# Patient Record
Sex: MALE | Race: WHITE | Employment: FULL TIME | URBAN - NONMETROPOLITAN AREA
[De-identification: names, ages, dates, MRNs, and addresses within clinical notes are randomized per-mention and may not be internally consistent; named-entity substitution may affect disease eponyms.]

---

## 2017-03-08 ENCOUNTER — OFFICE VISIT (OUTPATIENT)
Dept: FAMILY MEDICINE CLINIC | Age: 27
End: 2017-03-08

## 2017-03-08 VITALS
HEIGHT: 69 IN | WEIGHT: 197.2 LBS | SYSTOLIC BLOOD PRESSURE: 130 MMHG | DIASTOLIC BLOOD PRESSURE: 62 MMHG | RESPIRATION RATE: 14 BRPM | HEART RATE: 74 BPM | BODY MASS INDEX: 29.21 KG/M2

## 2017-03-08 DIAGNOSIS — Z11.4 SCREENING FOR HIV (HUMAN IMMUNODEFICIENCY VIRUS): ICD-10-CM

## 2017-03-08 DIAGNOSIS — N28.1 BILATERAL RENAL CYSTS: ICD-10-CM

## 2017-03-08 DIAGNOSIS — I10 ESSENTIAL HYPERTENSION: Primary | ICD-10-CM

## 2017-03-08 PROCEDURE — 99214 OFFICE O/P EST MOD 30 MIN: CPT | Performed by: FAMILY MEDICINE

## 2017-03-08 RX ORDER — AMLODIPINE BESYLATE AND BENAZEPRIL HYDROCHLORIDE 10; 40 MG/1; MG/1
CAPSULE ORAL
Qty: 90 CAPSULE | Refills: 1 | Status: SHIPPED | OUTPATIENT
Start: 2017-03-08 | End: 2017-09-13 | Stop reason: SDUPTHER

## 2017-08-22 ENCOUNTER — HOSPITAL ENCOUNTER (OUTPATIENT)
Dept: ULTRASOUND IMAGING | Age: 27
Discharge: HOME | End: 2017-08-22
Payer: COMMERCIAL

## 2017-08-22 DIAGNOSIS — N28.1 BILATERAL RENAL CYSTS: ICD-10-CM

## 2017-08-22 PROCEDURE — 76775 US EXAM ABDO BACK WALL LIM: CPT

## 2017-09-06 ENCOUNTER — HOSPITAL ENCOUNTER (OUTPATIENT)
Dept: LAB | Age: 27
Discharge: HOME | End: 2017-09-06
Payer: COMMERCIAL

## 2017-09-06 DIAGNOSIS — I10 ESSENTIAL HYPERTENSION: ICD-10-CM

## 2017-09-06 DIAGNOSIS — Z11.4 SCREENING FOR HIV (HUMAN IMMUNODEFICIENCY VIRUS): ICD-10-CM

## 2017-09-06 LAB
ALBUMIN SERPL-MCNC: 5.3 G/DL (ref 3.5–5.2)
ALBUMIN/GLOBULIN RATIO: 2 (ref 1–2.5)
ALP BLD-CCNC: 45 U/L (ref 40–129)
ALT SERPL-CCNC: 64 U/L (ref 5–41)
ANION GAP SERPL CALCULATED.3IONS-SCNC: 13 MMOL/L (ref 9–17)
AST SERPL-CCNC: 31 U/L
BILIRUB SERPL-MCNC: 1.38 MG/DL (ref 0.3–1.2)
BUN BLDV-MCNC: 12 MG/DL (ref 6–20)
BUN/CREAT BLD: 13 (ref 9–20)
CALCIUM SERPL-MCNC: 10 MG/DL (ref 8.6–10.4)
CHLORIDE BLD-SCNC: 96 MMOL/L (ref 98–107)
CHOLESTEROL/HDL RATIO: 3.2
CHOLESTEROL: 165 MG/DL
CO2: 29 MMOL/L (ref 20–31)
CREAT SERPL-MCNC: 0.9 MG/DL (ref 0.7–1.2)
GFR AFRICAN AMERICAN: >60 ML/MIN
GFR NON-AFRICAN AMERICAN: >60 ML/MIN
GFR SERPL CREATININE-BSD FRML MDRD: ABNORMAL ML/MIN/{1.73_M2}
GFR SERPL CREATININE-BSD FRML MDRD: ABNORMAL ML/MIN/{1.73_M2}
GLUCOSE BLD-MCNC: 94 MG/DL (ref 70–99)
HDLC SERPL-MCNC: 52 MG/DL
HIV AG/AB: NONREACTIVE
LDL CHOLESTEROL: 85 MG/DL (ref 0–130)
POTASSIUM SERPL-SCNC: 4.5 MMOL/L (ref 3.7–5.3)
SODIUM BLD-SCNC: 138 MMOL/L (ref 135–144)
TOTAL PROTEIN: 7.9 G/DL (ref 6.4–8.3)
TRIGL SERPL-MCNC: 139 MG/DL
VLDLC SERPL CALC-MCNC: NORMAL MG/DL (ref 1–30)

## 2017-09-06 PROCEDURE — 36415 COLL VENOUS BLD VENIPUNCTURE: CPT

## 2017-09-06 PROCEDURE — 80053 COMPREHEN METABOLIC PANEL: CPT

## 2017-09-06 PROCEDURE — 87389 HIV-1 AG W/HIV-1&-2 AB AG IA: CPT

## 2017-09-06 PROCEDURE — 80061 LIPID PANEL: CPT

## 2017-09-09 ENCOUNTER — PATIENT MESSAGE (OUTPATIENT)
Dept: FAMILY MEDICINE CLINIC | Age: 27
End: 2017-09-09

## 2017-09-13 ENCOUNTER — OFFICE VISIT (OUTPATIENT)
Dept: FAMILY MEDICINE CLINIC | Age: 27
End: 2017-09-13
Payer: COMMERCIAL

## 2017-09-13 VITALS
SYSTOLIC BLOOD PRESSURE: 138 MMHG | BODY MASS INDEX: 30.81 KG/M2 | HEIGHT: 69 IN | RESPIRATION RATE: 16 BRPM | WEIGHT: 208 LBS | HEART RATE: 80 BPM | DIASTOLIC BLOOD PRESSURE: 72 MMHG

## 2017-09-13 DIAGNOSIS — Z23 NEED FOR TDAP VACCINATION: ICD-10-CM

## 2017-09-13 DIAGNOSIS — I10 ESSENTIAL HYPERTENSION: Primary | ICD-10-CM

## 2017-09-13 DIAGNOSIS — Z23 NEED FOR INFLUENZA VACCINATION: ICD-10-CM

## 2017-09-13 DIAGNOSIS — L98.9 SKIN LESION OF SCALP: ICD-10-CM

## 2017-09-13 PROCEDURE — 99214 OFFICE O/P EST MOD 30 MIN: CPT | Performed by: FAMILY MEDICINE

## 2017-09-13 RX ORDER — AMLODIPINE BESYLATE AND BENAZEPRIL HYDROCHLORIDE 10; 40 MG/1; MG/1
CAPSULE ORAL
Qty: 90 CAPSULE | Refills: 1 | Status: SHIPPED | OUTPATIENT
Start: 2017-09-13 | End: 2018-08-15 | Stop reason: SDUPTHER

## 2018-05-08 ENCOUNTER — TELEPHONE (OUTPATIENT)
Dept: PRIMARY CARE CLINIC | Age: 28
End: 2018-05-08

## 2018-08-15 DIAGNOSIS — I10 ESSENTIAL HYPERTENSION: ICD-10-CM

## 2018-08-15 RX ORDER — AMLODIPINE BESYLATE AND BENAZEPRIL HYDROCHLORIDE 10; 40 MG/1; MG/1
1 CAPSULE ORAL DAILY
Qty: 90 CAPSULE | Refills: 0 | Status: SHIPPED | OUTPATIENT
Start: 2018-08-15 | End: 2018-12-19 | Stop reason: SDUPTHER

## 2018-10-11 ENCOUNTER — TELEPHONE (OUTPATIENT)
Dept: PRIMARY CARE CLINIC | Age: 28
End: 2018-10-11

## 2018-10-31 ENCOUNTER — TELEPHONE (OUTPATIENT)
Dept: PRIMARY CARE CLINIC | Age: 28
End: 2018-10-31

## 2018-12-19 DIAGNOSIS — I10 ESSENTIAL HYPERTENSION: ICD-10-CM

## 2018-12-19 RX ORDER — AMLODIPINE BESYLATE AND BENAZEPRIL HYDROCHLORIDE 10; 40 MG/1; MG/1
1 CAPSULE ORAL DAILY
Qty: 90 CAPSULE | Refills: 0 | Status: SHIPPED | OUTPATIENT
Start: 2018-12-19 | End: 2019-01-02 | Stop reason: SDUPTHER

## 2018-12-19 NOTE — TELEPHONE ENCOUNTER
From: Salvador Vogel  Sent: 12/19/2018 12:30 PM EST  Subject: Medication Renewal Request    Clarke HUNTTremaine Frank would like a refill of the following medications:     amLODIPine-benazepril (LOTREL) 10-40 MG per capsule Lawrence Mar MD]   Patient Comment: I forgot to mention this in my earlier request. Luz Mejias submitted a refill order for this medication on November 13th, but it was declined by the clinic. I am now running very low on this medication as a result and need to have it refilled as soon as possible. I am changing the pharmacy to the [facility], because it will be faster than having it mailed by [facility]. Thanks.     Preferred pharmacy: [facility] [phone]

## 2019-01-02 ENCOUNTER — HOSPITAL ENCOUNTER (OUTPATIENT)
Dept: LAB | Age: 29
Discharge: HOME | End: 2019-01-02
Payer: COMMERCIAL

## 2019-01-02 ENCOUNTER — OFFICE VISIT (OUTPATIENT)
Dept: FAMILY MEDICINE CLINIC | Age: 29
End: 2019-01-02
Payer: COMMERCIAL

## 2019-01-02 VITALS
WEIGHT: 220.4 LBS | HEART RATE: 86 BPM | BODY MASS INDEX: 32.64 KG/M2 | HEIGHT: 69 IN | SYSTOLIC BLOOD PRESSURE: 128 MMHG | DIASTOLIC BLOOD PRESSURE: 82 MMHG | RESPIRATION RATE: 16 BRPM

## 2019-01-02 DIAGNOSIS — I10 ESSENTIAL HYPERTENSION: ICD-10-CM

## 2019-01-02 DIAGNOSIS — I10 ESSENTIAL HYPERTENSION: Primary | ICD-10-CM

## 2019-01-02 DIAGNOSIS — Z23 NEED FOR TDAP VACCINATION: ICD-10-CM

## 2019-01-02 LAB
ALBUMIN SERPL-MCNC: 5.3 G/DL (ref 3.5–5.2)
ALBUMIN/GLOBULIN RATIO: 2.2 (ref 1–2.5)
ALP BLD-CCNC: 52 U/L (ref 40–129)
ALT SERPL-CCNC: 59 U/L (ref 5–41)
ANION GAP SERPL CALCULATED.3IONS-SCNC: 12 MMOL/L (ref 9–17)
AST SERPL-CCNC: 26 U/L
BILIRUB SERPL-MCNC: 0.88 MG/DL (ref 0.3–1.2)
BUN BLDV-MCNC: 15 MG/DL (ref 6–20)
BUN/CREAT BLD: 14 (ref 9–20)
CALCIUM SERPL-MCNC: 10.1 MG/DL (ref 8.6–10.4)
CHLORIDE BLD-SCNC: 101 MMOL/L (ref 98–107)
CO2: 28 MMOL/L (ref 20–31)
CREAT SERPL-MCNC: 1.04 MG/DL (ref 0.7–1.2)
GFR AFRICAN AMERICAN: >60 ML/MIN
GFR NON-AFRICAN AMERICAN: >60 ML/MIN
GFR SERPL CREATININE-BSD FRML MDRD: ABNORMAL ML/MIN/{1.73_M2}
GFR SERPL CREATININE-BSD FRML MDRD: ABNORMAL ML/MIN/{1.73_M2}
GLUCOSE BLD-MCNC: 106 MG/DL (ref 70–99)
POTASSIUM SERPL-SCNC: 4.6 MMOL/L (ref 3.7–5.3)
SODIUM BLD-SCNC: 141 MMOL/L (ref 135–144)
TOTAL PROTEIN: 7.7 G/DL (ref 6.4–8.3)

## 2019-01-02 PROCEDURE — 80053 COMPREHEN METABOLIC PANEL: CPT

## 2019-01-02 PROCEDURE — 36415 COLL VENOUS BLD VENIPUNCTURE: CPT

## 2019-01-02 PROCEDURE — 99213 OFFICE O/P EST LOW 20 MIN: CPT | Performed by: FAMILY MEDICINE

## 2019-01-02 RX ORDER — AMLODIPINE BESYLATE AND BENAZEPRIL HYDROCHLORIDE 10; 40 MG/1; MG/1
1 CAPSULE ORAL DAILY
Qty: 90 CAPSULE | Refills: 1 | Status: SHIPPED | OUTPATIENT
Start: 2019-01-02 | End: 2019-07-02 | Stop reason: SDUPTHER

## 2019-01-02 ASSESSMENT — PATIENT HEALTH QUESTIONNAIRE - PHQ9
2. FEELING DOWN, DEPRESSED OR HOPELESS: 0
SUM OF ALL RESPONSES TO PHQ QUESTIONS 1-9: 0
SUM OF ALL RESPONSES TO PHQ9 QUESTIONS 1 & 2: 0
SUM OF ALL RESPONSES TO PHQ QUESTIONS 1-9: 0
1. LITTLE INTEREST OR PLEASURE IN DOING THINGS: 0

## 2019-01-03 DIAGNOSIS — I10 ESSENTIAL HYPERTENSION: Primary | ICD-10-CM

## 2019-01-03 DIAGNOSIS — R73.01 ELEVATED FASTING GLUCOSE: ICD-10-CM

## 2019-01-30 ENCOUNTER — TELEPHONE (OUTPATIENT)
Dept: PRIMARY CARE CLINIC | Age: 29
End: 2019-01-30

## 2019-03-13 ENCOUNTER — TELEPHONE (OUTPATIENT)
Dept: PRIMARY CARE CLINIC | Age: 29
End: 2019-03-13

## 2019-06-27 ENCOUNTER — HOSPITAL ENCOUNTER (OUTPATIENT)
Dept: LAB | Age: 29
Discharge: HOME | End: 2019-06-27
Payer: COMMERCIAL

## 2019-06-27 DIAGNOSIS — I10 ESSENTIAL HYPERTENSION: ICD-10-CM

## 2019-06-27 DIAGNOSIS — R73.01 ELEVATED FASTING GLUCOSE: ICD-10-CM

## 2019-06-27 LAB
ALBUMIN SERPL-MCNC: 5.3 G/DL (ref 3.5–5.2)
ALBUMIN/GLOBULIN RATIO: 2.4 (ref 1–2.5)
ALP BLD-CCNC: 47 U/L (ref 40–129)
ALT SERPL-CCNC: 56 U/L (ref 5–41)
ANION GAP SERPL CALCULATED.3IONS-SCNC: 10 MMOL/L (ref 9–17)
AST SERPL-CCNC: 29 U/L
BILIRUB SERPL-MCNC: 1.37 MG/DL (ref 0.3–1.2)
BUN BLDV-MCNC: 14 MG/DL (ref 6–20)
BUN/CREAT BLD: 15 (ref 9–20)
CALCIUM SERPL-MCNC: 10.2 MG/DL (ref 8.6–10.4)
CHLORIDE BLD-SCNC: 99 MMOL/L (ref 98–107)
CHOLESTEROL, FASTING: 148 MG/DL
CHOLESTEROL/HDL RATIO: 3.2
CO2: 30 MMOL/L (ref 20–31)
CREAT SERPL-MCNC: 0.95 MG/DL (ref 0.7–1.2)
ESTIMATED AVERAGE GLUCOSE: 82 MG/DL
GFR AFRICAN AMERICAN: >60 ML/MIN
GFR NON-AFRICAN AMERICAN: >60 ML/MIN
GFR SERPL CREATININE-BSD FRML MDRD: ABNORMAL ML/MIN/{1.73_M2}
GFR SERPL CREATININE-BSD FRML MDRD: ABNORMAL ML/MIN/{1.73_M2}
GLUCOSE FASTING: 98 MG/DL (ref 70–99)
HBA1C MFR BLD: 4.5 % (ref 4.8–5.9)
HDLC SERPL-MCNC: 46 MG/DL
LDL CHOLESTEROL: 79 MG/DL (ref 0–130)
POTASSIUM SERPL-SCNC: 4.5 MMOL/L (ref 3.7–5.3)
SODIUM BLD-SCNC: 139 MMOL/L (ref 135–144)
TOTAL PROTEIN: 7.5 G/DL (ref 6.4–8.3)
TRIGLYCERIDE, FASTING: 117 MG/DL
VLDLC SERPL CALC-MCNC: NORMAL MG/DL (ref 1–30)

## 2019-06-27 PROCEDURE — 80053 COMPREHEN METABOLIC PANEL: CPT

## 2019-06-27 PROCEDURE — 83036 HEMOGLOBIN GLYCOSYLATED A1C: CPT

## 2019-06-27 PROCEDURE — 36415 COLL VENOUS BLD VENIPUNCTURE: CPT

## 2019-06-27 PROCEDURE — 80061 LIPID PANEL: CPT

## 2019-07-02 ENCOUNTER — OFFICE VISIT (OUTPATIENT)
Dept: FAMILY MEDICINE CLINIC | Age: 29
End: 2019-07-02
Payer: COMMERCIAL

## 2019-07-02 VITALS
HEART RATE: 74 BPM | HEIGHT: 69 IN | DIASTOLIC BLOOD PRESSURE: 78 MMHG | SYSTOLIC BLOOD PRESSURE: 118 MMHG | WEIGHT: 217.4 LBS | RESPIRATION RATE: 16 BRPM | BODY MASS INDEX: 32.2 KG/M2

## 2019-07-02 DIAGNOSIS — I10 ESSENTIAL HYPERTENSION: Primary | ICD-10-CM

## 2019-07-02 PROCEDURE — 99213 OFFICE O/P EST LOW 20 MIN: CPT | Performed by: FAMILY MEDICINE

## 2019-07-02 RX ORDER — AMLODIPINE BESYLATE AND BENAZEPRIL HYDROCHLORIDE 10; 40 MG/1; MG/1
1 CAPSULE ORAL DAILY
Qty: 90 CAPSULE | Refills: 1 | Status: SHIPPED | OUTPATIENT
Start: 2019-07-02 | End: 2020-01-08 | Stop reason: SDUPTHER

## 2019-07-02 ASSESSMENT — PATIENT HEALTH QUESTIONNAIRE - PHQ9
1. LITTLE INTEREST OR PLEASURE IN DOING THINGS: 0
SUM OF ALL RESPONSES TO PHQ9 QUESTIONS 1 & 2: 0
SUM OF ALL RESPONSES TO PHQ QUESTIONS 1-9: 0
2. FEELING DOWN, DEPRESSED OR HOPELESS: 0
SUM OF ALL RESPONSES TO PHQ QUESTIONS 1-9: 0

## 2019-07-02 NOTE — PROGRESS NOTES
or rub. Lower extremities have no edema. Labs done 6/27/2019 were reviewed with the patient:   Hospital Outpatient Visit on 06/27/2019   Component Date Value Ref Range Status    Hemoglobin A1C 06/27/2019 4.5* 4.8 - 5.9 % Final    Estimated Avg Glucose 06/27/2019 82  mg/dL Final    Cholesterol, Fasting 06/27/2019 148  <200 mg/dL Final    Comment:    Cholesterol Guidelines:      <200  Desirable   200-240  Borderline      >240  Undesirable         HDL 06/27/2019 46  >40 mg/dL Final    Comment:    HDL Guidelines:    <40     Undesirable   40-59    Borderline    >59     Desirable         LDL Cholesterol 06/27/2019 79  0 - 130 mg/dL Final    Comment:    LDL Guidelines:     <100    Desirable   100-129   Near to/above Desirable   130-159   Borderline      >159   Undesirable     Direct (measured) LDL and calculated LDL are not interchangeable tests.  Chol/HDL Ratio 06/27/2019 3.2  <5 Final            Triglyceride, Fasting 06/27/2019 117  <150 mg/dL Final    Comment:    Triglyceride Guidelines:     <150   Desirable   150-199  Borderline   200-499  High     >499   Very high   Based on AHA Guidelines for fasting triglyceride, October 2012.          VLDL 06/27/2019 NOT REPORTED  1 - 30 mg/dL Final    Glucose, Fasting 06/27/2019 98  70 - 99 mg/dL Final    BUN 06/27/2019 14  6 - 20 mg/dL Final    CREATININE 06/27/2019 0.95  0.70 - 1.20 mg/dL Final    Bun/Cre Ratio 06/27/2019 15  9 - 20 Final    Calcium 06/27/2019 10.2  8.6 - 10.4 mg/dL Final    Sodium 06/27/2019 139  135 - 144 mmol/L Final    Potassium 06/27/2019 4.5  3.7 - 5.3 mmol/L Final    Chloride 06/27/2019 99  98 - 107 mmol/L Final    CO2 06/27/2019 30  20 - 31 mmol/L Final    Anion Gap 06/27/2019 10  9 - 17 mmol/L Final    Alkaline Phosphatase 06/27/2019 47  40 - 129 U/L Final    ALT 06/27/2019 56* 5 - 41 U/L Final    AST 06/27/2019 29  <40 U/L Final    Total Bilirubin 06/27/2019 1.37* 0.3 - 1.2 mg/dL Final    Total Protein 06/27/2019 7.5  6.4 - 8.3 g/dL Final    Alb 06/27/2019 5.3* 3.5 - 5.2 g/dL Final    Albumin/Globulin Ratio 06/27/2019 2.4  1.0 - 2.5 Final    GFR Non- 06/27/2019 >60  >60 mL/min Final    GFR  06/27/2019 >60  >60 mL/min Final    GFR Comment 06/27/2019        Final    Comment: Average GFR for 30-36 years old:   80 mL/min/1.73sq m  Chronic Kidney Disease:   <60 mL/min/1.73sq m  Kidney failure:   <15 mL/min/1.73sq m              eGFR calculated using average adult body mass. Additional eGFR calculator available at:        [email or web address]            GFR Staging 06/27/2019 NOT REPORTED   Final         Assessment and Plan:    1. Essential hypertension  His blood pressure is well-controlled. (BP: 118/78)   He was advised to continue current medications. Amlodipine-Benazepril was refilled:   - amLODIPine-benazepril (LOTREL) 10-40 MG per capsule; Take 1 capsule by mouth daily  Dispense: 90 capsule; Refill: 1    - Comprehensive Metabolic Panel; Future prior to his return visit in 6 months. 2.  Routine health maintenance  Health maintenance was reviewed with the patient. Annual influenza vaccine was recommended. He states that he did received at least one varicella vaccine. I will see if documentation from the health department can be obtained. All other health maintenance, including Tdap, is up to date at this time. There is no indication for Pneumovax or Prevnar-13 at this time.      (Please note that portions of this note were completed with a voice-recognition program. Efforts were made to edit the dictation but occasionally words are mis-transcribed.)

## 2020-01-02 ENCOUNTER — HOSPITAL ENCOUNTER (OUTPATIENT)
Dept: LAB | Age: 30
Discharge: HOME | End: 2020-01-02
Payer: COMMERCIAL

## 2020-01-02 LAB
ALBUMIN SERPL-MCNC: 5.3 G/DL (ref 3.5–5.2)
ALBUMIN/GLOBULIN RATIO: 2 (ref 1–2.5)
ALP BLD-CCNC: 53 U/L (ref 40–129)
ALT SERPL-CCNC: 66 U/L (ref 5–41)
ANION GAP SERPL CALCULATED.3IONS-SCNC: 13 MMOL/L (ref 9–17)
AST SERPL-CCNC: 32 U/L
BILIRUB SERPL-MCNC: 1.03 MG/DL (ref 0.3–1.2)
BUN BLDV-MCNC: 13 MG/DL (ref 6–20)
BUN/CREAT BLD: 13 (ref 9–20)
CALCIUM SERPL-MCNC: 10.3 MG/DL (ref 8.6–10.4)
CHLORIDE BLD-SCNC: 101 MMOL/L (ref 98–107)
CO2: 28 MMOL/L (ref 20–31)
CREAT SERPL-MCNC: 1.01 MG/DL (ref 0.7–1.2)
GFR AFRICAN AMERICAN: >60 ML/MIN
GFR NON-AFRICAN AMERICAN: >60 ML/MIN
GFR SERPL CREATININE-BSD FRML MDRD: ABNORMAL ML/MIN/{1.73_M2}
GFR SERPL CREATININE-BSD FRML MDRD: ABNORMAL ML/MIN/{1.73_M2}
GLUCOSE BLD-MCNC: 104 MG/DL (ref 70–99)
POTASSIUM SERPL-SCNC: 4.5 MMOL/L (ref 3.7–5.3)
SODIUM BLD-SCNC: 142 MMOL/L (ref 135–144)
TOTAL PROTEIN: 8 G/DL (ref 6.4–8.3)

## 2020-01-02 PROCEDURE — 80053 COMPREHEN METABOLIC PANEL: CPT

## 2020-01-02 PROCEDURE — 36415 COLL VENOUS BLD VENIPUNCTURE: CPT

## 2020-01-08 ENCOUNTER — OFFICE VISIT (OUTPATIENT)
Dept: FAMILY MEDICINE CLINIC | Age: 30
End: 2020-01-08
Payer: COMMERCIAL

## 2020-01-08 VITALS
BODY MASS INDEX: 33.04 KG/M2 | HEART RATE: 84 BPM | DIASTOLIC BLOOD PRESSURE: 86 MMHG | SYSTOLIC BLOOD PRESSURE: 128 MMHG | WEIGHT: 223.1 LBS | RESPIRATION RATE: 16 BRPM | HEIGHT: 69 IN

## 2020-01-08 PROCEDURE — 99212 OFFICE O/P EST SF 10 MIN: CPT | Performed by: FAMILY MEDICINE

## 2020-01-08 PROCEDURE — 99395 PREV VISIT EST AGE 18-39: CPT | Performed by: FAMILY MEDICINE

## 2020-01-08 RX ORDER — AMLODIPINE BESYLATE AND BENAZEPRIL HYDROCHLORIDE 10; 40 MG/1; MG/1
1 CAPSULE ORAL DAILY
Qty: 90 CAPSULE | Refills: 1 | Status: SHIPPED | OUTPATIENT
Start: 2020-01-08 | End: 2020-04-09 | Stop reason: SDUPTHER

## 2020-01-08 ASSESSMENT — PATIENT HEALTH QUESTIONNAIRE - PHQ9
2. FEELING DOWN, DEPRESSED OR HOPELESS: 0
SUM OF ALL RESPONSES TO PHQ QUESTIONS 1-9: 0
SUM OF ALL RESPONSES TO PHQ9 QUESTIONS 1 & 2: 0
1. LITTLE INTEREST OR PLEASURE IN DOING THINGS: 0
SUM OF ALL RESPONSES TO PHQ QUESTIONS 1-9: 0

## 2020-01-08 NOTE — PROGRESS NOTES
[facility]             [address] [facility] [place], [address]                        Telephone [phone]             Fax [phone]     Barney Chappell  1990  MRN:  [id number]  Date of visit:  1/8/2020    Subjective:    Barney Chappell is a 30 y.o.  male who presents to [facility] today (1/8/2020) for follow up/evaluation of: Annual Exam and Hypertension      He states that he is feeling well. He reports that he has not been exercising regularly. He denies chest pain or shortness of breath with activity or at rest.  He is tolerating his medications well. He has no new concerns or complaints today. He has the following problem list:  Patient Active Problem List   Diagnosis    Bilateral renal cysts    Hypercalcemia    Essential hypertension    Obesity        Current medications are:  Outpatient Medications Marked as Taking for the 1/8/20 encounter (Office Visit) with Angel Novak MD   Medication Sig Dispense Refill    amLODIPine-benazepril (LOTREL) 10-40 MG per capsule Take 1 capsule by mouth daily 90 capsule 1    Multiple Vitamins-Minerals (MULTIVITAMIN PO) Take 1 tablet by mouth daily. He has No Known Allergies. He  reports that he has never smoked. He has never used smokeless tobacco.      Objective:    Vitals:    01/08/20 0949   BP: 128/86   Site: Right Upper Arm   Position: Sitting   Cuff Size: Large Adult   Pulse: 84   Resp: 16   Weight: 223 lb 1.6 oz (101.2 kg)   Height: 5' 9\" (1.753 m)     Body mass index is 32.95 kg/m². Obese  male, healthy-appearing, alert, cooperative and in no distress. Neck supple. No adenopathy. Thyroid symmetric, normal size. Chest:  Normal expansion. Clear to auscultation. No rales, rhonchi, or wheezing. Heart sounds are normal.  Regular rate and rhythm without murmur, gallop or rub. Lower extremities have no edema.     Labs were ordered to be done prior to his return visit in 6 months:  - Lipid, Fasting; Future  - Comprehensive Metabolic Panel; Future    Printed information regarding DASH Diet was provided to the patient with his after visit summary. 3. Elevated fasting glucose  I recommended that he decrease his intake of processed carbohydrates, begin a regular exercise program, and attempt to lose weight. Labs were ordered to be done prior to his return visit in 6 months:   - Comprehensive Metabolic Panel; Future  - Hemoglobin A1C; Future    Printed information regarding Prediabetes was provided to the patient with his after visit summary. 4. Immunity status testing  I discussed with the patient that I recommend either getting a second dose of varicella vaccine, or assessing immune status. - Varicella Zoster Antibody, IgG; Future was ordered to be done prior to his return visit in 6 months. 4.  Routine health maintenance  Health maintenance was reviewed with the patient. He has received an influenza vaccine this influenza season. All other health maintenance, including Tdap, is up to date at this time. There is no indication for Pneumovax or Prevnar-13 at this time.      (Please note that portions of this note were completed with a voice-recognition program. Efforts were made to edit the dictation but occasionally words are mis-transcribed.)

## 2020-01-08 NOTE — PATIENT INSTRUCTIONS
poultry. The DASH diet is one of several lifestyle changes your doctor may recommend to lower your high blood pressure. Your doctor may also want you to decrease the amount of sodium in your diet. Lowering sodium while following the DASH diet can lower blood pressure even further than just the DASH diet alone. Follow-up care is a key part of your treatment and safety. Be sure to make and go to all appointments, and call your doctor if you are having problems. It's also a good idea to know your test results and keep a list of the medicines you take. How can you care for yourself at home? Following the DASH diet  · Eat 4 to 5 servings of fruit each day. A serving is 1 medium-sized piece of fruit, ½ cup chopped or canned fruit, 1/4 cup dried fruit, or 4 ounces (½ cup) of fruit juice. Choose fruit more often than fruit juice. · Eat 4 to 5 servings of vegetables each day. A serving is 1 cup of lettuce or raw leafy vegetables, ½ cup of chopped or cooked vegetables, or 4 ounces (½ cup) of vegetable juice. Choose vegetables more often than vegetable juice. · Get 2 to 3 servings of low-fat and fat-free dairy each day. A serving is 8 ounces of milk, 1 cup of yogurt, or 1 ½ ounces of cheese. · Eat 6 to 8 servings of grains each day. A serving is 1 slice of bread, 1 ounce of dry cereal, or ½ cup of cooked rice, pasta, or cooked cereal. Try to choose whole-grain products as much as possible. · Limit lean meat, poultry, and fish to 2 servings each day. A serving is 3 ounces, about the size of a deck of cards. · Eat 4 to 5 servings of nuts, seeds, and legumes (cooked dried beans, lentils, and split peas) each week. A serving is 1/3 cup of nuts, 2 tablespoons of seeds, or ½ cup of cooked beans or peas. · Limit fats and oils to 2 to 3 servings each day. A serving is 1 teaspoon of vegetable oil or 2 tablespoons of salad dressing. · Limit sweets and added sugars to 5 servings or less a week.  A serving is 1 tablespoon jelly

## 2020-04-09 ENCOUNTER — PATIENT MESSAGE (OUTPATIENT)
Dept: FAMILY MEDICINE CLINIC | Age: 30
End: 2020-04-09

## 2020-04-09 RX ORDER — AMLODIPINE BESYLATE AND BENAZEPRIL HYDROCHLORIDE 10; 40 MG/1; MG/1
1 CAPSULE ORAL DAILY
Qty: 30 CAPSULE | Refills: 0 | Status: SHIPPED | OUTPATIENT
Start: 2020-04-09 | End: 2020-04-28

## 2020-04-09 NOTE — TELEPHONE ENCOUNTER
From: Brandon Dill  To: Niharika Steiner MD  Sent: 4/9/2020 9:38 AM EDT  Subject: Prescription Question    My blood pressure medication refill was shipped out on March 24th, but I still haven't received it yet. The last update on the tracking page is that it was at a distribution center in [facility] on March 25th. I only have about a week of blood pressure medication left. I was wondering if I could have a prescription put in at the  [facility] as a backup in case my medicine doesn't arrive before I run out. Thanks.

## 2020-04-28 RX ORDER — AMLODIPINE BESYLATE AND BENAZEPRIL HYDROCHLORIDE 10; 40 MG/1; MG/1
CAPSULE ORAL
Qty: 90 CAPSULE | Refills: 0 | Status: SHIPPED | OUTPATIENT
Start: 2020-04-28 | End: 2020-07-09 | Stop reason: SDUPTHER

## 2020-07-06 ENCOUNTER — HOSPITAL ENCOUNTER (OUTPATIENT)
Dept: LAB | Age: 30
Discharge: HOME | End: 2020-07-06
Payer: COMMERCIAL

## 2020-07-06 LAB
ALBUMIN SERPL-MCNC: 5.3 G/DL (ref 3.5–5.2)
ALBUMIN/GLOBULIN RATIO: 2.7 (ref 1–2.5)
ALP BLD-CCNC: 49 U/L (ref 40–129)
ALT SERPL-CCNC: 42 U/L (ref 5–41)
ANION GAP SERPL CALCULATED.3IONS-SCNC: 13 MMOL/L (ref 9–17)
AST SERPL-CCNC: 24 U/L
BILIRUB SERPL-MCNC: 0.99 MG/DL (ref 0.3–1.2)
BUN BLDV-MCNC: 12 MG/DL (ref 6–20)
BUN/CREAT BLD: 12 (ref 9–20)
CALCIUM SERPL-MCNC: 10.2 MG/DL (ref 8.6–10.4)
CHLORIDE BLD-SCNC: 102 MMOL/L (ref 98–107)
CHOLESTEROL, FASTING: 153 MG/DL
CHOLESTEROL/HDL RATIO: 2.9
CO2: 27 MMOL/L (ref 20–31)
CREAT SERPL-MCNC: 1.03 MG/DL (ref 0.7–1.2)
ESTIMATED AVERAGE GLUCOSE: 88 MG/DL
GFR AFRICAN AMERICAN: >60 ML/MIN
GFR NON-AFRICAN AMERICAN: >60 ML/MIN
GFR SERPL CREATININE-BSD FRML MDRD: ABNORMAL ML/MIN/{1.73_M2}
GFR SERPL CREATININE-BSD FRML MDRD: ABNORMAL ML/MIN/{1.73_M2}
GLUCOSE BLD-MCNC: 104 MG/DL (ref 70–99)
HBA1C MFR BLD: 4.7 % (ref 4.8–5.9)
HDLC SERPL-MCNC: 52 MG/DL
LDL CHOLESTEROL: 80 MG/DL (ref 0–130)
POTASSIUM SERPL-SCNC: 5 MMOL/L (ref 3.7–5.3)
SODIUM BLD-SCNC: 142 MMOL/L (ref 135–144)
TOTAL PROTEIN: 7.3 G/DL (ref 6.4–8.3)
TRIGLYCERIDE, FASTING: 105 MG/DL
VLDLC SERPL CALC-MCNC: NORMAL MG/DL (ref 1–30)

## 2020-07-06 PROCEDURE — 80053 COMPREHEN METABOLIC PANEL: CPT

## 2020-07-06 PROCEDURE — 80061 LIPID PANEL: CPT

## 2020-07-06 PROCEDURE — 86787 VARICELLA-ZOSTER ANTIBODY: CPT

## 2020-07-06 PROCEDURE — 36415 COLL VENOUS BLD VENIPUNCTURE: CPT

## 2020-07-06 PROCEDURE — 83036 HEMOGLOBIN GLYCOSYLATED A1C: CPT

## 2020-07-08 LAB — VZV IGG SER QL IA: 0.68

## 2020-07-09 ENCOUNTER — OFFICE VISIT (OUTPATIENT)
Dept: FAMILY MEDICINE CLINIC | Age: 30
End: 2020-07-09
Payer: COMMERCIAL

## 2020-07-09 VITALS
HEIGHT: 69 IN | HEART RATE: 86 BPM | DIASTOLIC BLOOD PRESSURE: 72 MMHG | SYSTOLIC BLOOD PRESSURE: 130 MMHG | WEIGHT: 202.8 LBS | BODY MASS INDEX: 30.04 KG/M2 | RESPIRATION RATE: 16 BRPM

## 2020-07-09 PROCEDURE — 99213 OFFICE O/P EST LOW 20 MIN: CPT | Performed by: FAMILY MEDICINE

## 2020-07-09 RX ORDER — AMLODIPINE BESYLATE AND BENAZEPRIL HYDROCHLORIDE 10; 40 MG/1; MG/1
CAPSULE ORAL
Qty: 90 CAPSULE | Refills: 1 | Status: SHIPPED | OUTPATIENT
Start: 2020-07-09 | End: 2021-01-12 | Stop reason: SDUPTHER

## 2020-07-09 ASSESSMENT — PATIENT HEALTH QUESTIONNAIRE - PHQ9
1. LITTLE INTEREST OR PLEASURE IN DOING THINGS: 0
SUM OF ALL RESPONSES TO PHQ QUESTIONS 1-9: 0
SUM OF ALL RESPONSES TO PHQ9 QUESTIONS 1 & 2: 0
SUM OF ALL RESPONSES TO PHQ QUESTIONS 1-9: 0
2. FEELING DOWN, DEPRESSED OR HOPELESS: 0

## 2020-07-09 NOTE — PROGRESS NOTES
[address], [address] [place] [address] Drive                        Telephone [phone]             Fax [phone]     Tere Sheth  1990  MRN:  [id number]  Date of visit:  7/9/2020    Subjective:    Tere Sheth is a 30 y.o.  male who presents to [facility] today (7/9/2020) for follow up/evaluation of:  Hypertension and Hyperglycemia      He states that he has been feeling well. He is tolerating his medications well. He has been exercising occasionally. He denies chest pain or shortness of breath with activity or at rest.  He has been trying to lose weight. He has no new concerns or complaints today. He has the following problem list:  Patient Active Problem List   Diagnosis    Bilateral renal cysts    Hypercalcemia    Essential hypertension    Obesity        Current medications are:  Outpatient Medications Marked as Taking for the 7/9/20 encounter (Office Visit) with Nikos Fierro MD   Medication Sig Dispense Refill    amLODIPine-benazepril (LOTREL) 10-40 MG per capsule TAKE 1 CAPSULE DAILY 90 capsule 0    Multiple Vitamins-Minerals (MULTIVITAMIN PO) Take 1 tablet by mouth daily. He has No Known Allergies. He  reports that he has never smoked. He has never used smokeless tobacco.      Objective:    Vitals:    07/09/20 0930   BP: 130/72   Site: Right Upper Arm   Position: Sitting   Cuff Size: Medium Adult   Pulse: 86   Resp: 16   Weight: 202 lb 12.8 oz (92 kg)   Height: 5' 9\" (1.753 m)     Body mass index is 29.95 kg/m². Overweight  male, healthy-appearing, alert, cooperative and in no distress. Neck supple. No adenopathy. Thyroid symmetric, normal size. Chest:  Normal expansion. Clear to auscultation. No rales, rhonchi, or wheezing. Heart sounds are normal.  Regular rate and rhythm without murmur, gallop or rub.   Lower extremities have no edema. Labs done 7/6/2020 were reviewed with the patient:   Hospital Outpatient Visit on 07/06/2020   Component Date Value Ref Range Status    VARICELLA ZOSTER AB IGG 07/06/2020 0.68* >1.09 Final    Comment:    Interpretation:  Not Immune     Reference Range:  <0.91         Not Immune  0.91-1.09     Equivocal  >1.09         Immune         Hemoglobin A1C 07/06/2020 4.7* 4.8 - 5.9 % Final    Estimated Avg Glucose 07/06/2020 88  mg/dL Final    Glucose 07/06/2020 104* 70 - 99 mg/dL Final    BUN 07/06/2020 12  6 - 20 mg/dL Final    CREATININE 07/06/2020 1.03  0.70 - 1.20 mg/dL Final    Bun/Cre Ratio 07/06/2020 12  9 - 20 Final    Calcium 07/06/2020 10.2  8.6 - 10.4 mg/dL Final    Sodium 07/06/2020 142  135 - 144 mmol/L Final    Potassium 07/06/2020 5.0  3.7 - 5.3 mmol/L Final    Chloride 07/06/2020 102  98 - 107 mmol/L Final    CO2 07/06/2020 27  20 - 31 mmol/L Final    Anion Gap 07/06/2020 13  9 - 17 mmol/L Final    Alkaline Phosphatase 07/06/2020 49  40 - 129 U/L Final    ALT 07/06/2020 42* 5 - 41 U/L Final    AST 07/06/2020 24  <40 U/L Final    Total Bilirubin 07/06/2020 0.99  0.3 - 1.2 mg/dL Final    Total Protein 07/06/2020 7.3  6.4 - 8.3 g/dL Final    Alb 07/06/2020 5.3* 3.5 - 5.2 g/dL Final    Albumin/Globulin Ratio 07/06/2020 2.7* 1.0 - 2.5 Final    GFR Non- 07/06/2020 >60  >60 mL/min Final    GFR  07/06/2020 >60  >60 mL/min Final    GFR Comment 07/06/2020        Final    Comment: Average GFR for 30-36 years old:   80 mL/min/1.73sq m  Chronic Kidney Disease:   <60 mL/min/1.73sq m  Kidney failure:   <15 mL/min/1.73sq m              eGFR calculated using average adult body mass.  Additional eGFR calculator available at:        [email or web address]            GFR Staging 07/06/2020 NOT REPORTED   Final    Cholesterol, Fasting 07/06/2020 153  <200 mg/dL Final    Comment:    Cholesterol Guidelines:      <200  Desirable 200-240  Borderline      >240  Undesirable         HDL 07/06/2020 52  >40 mg/dL Final    Comment:    HDL Guidelines:    <40     Undesirable   40-59    Borderline    >59     Desirable         LDL Cholesterol 07/06/2020 80  0 - 130 mg/dL Final    Comment:    LDL Guidelines:     <100    Desirable   100-129   Near to/above Desirable   130-159   Borderline      >159   Undesirable     Direct (measured) LDL and calculated LDL are not interchangeable tests.  Chol/HDL Ratio 07/06/2020 2.9  <5 Final            Triglyceride, Fasting 07/06/2020 105  <150 mg/dL Final    Comment:    Triglyceride Guidelines:     <150   Desirable   150-199  Borderline   200-499  High     >499   Very high   Based on AHA Guidelines for fasting triglyceride, October 2012.  VLDL 07/06/2020 NOT REPORTED  1 - 30 mg/dL Final         Assessment and Plan:    1. Essential hypertension  His blood pressure is adequately-controlled. (BP: 130/72)   He was advised to continue current medications. Amlodipine-Benazepril was refilled:   - amLODIPine-benazepril (LOTREL) 10-40 MG per capsule; TAKE 1 CAPSULE DAILY  Dispense: 90 capsule; Refill: 1    - Comprehensive Metabolic Panel; Future prior to his return visit in 6 months. 2. Overweight (BMI 25.0-29. 9)  His weight has decreased 21 pounds since his visit in January. He was praised for his success. He was encouraged to continue to exercise regularly and eat a healthy diet. 3. Need for varicella vaccine  I advised the patient that the varicella IgG indicates non-immunity. He has received one varicella vaccine. I recommended that he get the second vaccine. He plans to check insurance coverage first.    4.  Routine health maintenance  Health maintenance was reviewed with the patient. Annual influenza vaccine was recommended. Varicella vaccine was recommended as above. All other health maintenance, including Tdap, is up to date at this time.   There is no indication for Pneumovax or Prevnar-13 at this time.      (Please note that portions of this note were completed with a voice-recognition program. Efforts were made to edit the dictation but occasionally words are mis-transcribed.)

## 2021-01-07 ENCOUNTER — HOSPITAL ENCOUNTER (OUTPATIENT)
Dept: LAB | Age: 31
Discharge: HOME | End: 2021-01-07
Payer: COMMERCIAL

## 2021-01-07 DIAGNOSIS — I10 ESSENTIAL HYPERTENSION: ICD-10-CM

## 2021-01-07 LAB
ALBUMIN SERPL-MCNC: 5.2 G/DL (ref 3.5–5.2)
ALBUMIN/GLOBULIN RATIO: 2.3 (ref 1–2.5)
ALP BLD-CCNC: 46 U/L (ref 40–129)
ALT SERPL-CCNC: 47 U/L (ref 5–41)
ANION GAP SERPL CALCULATED.3IONS-SCNC: 10 MMOL/L (ref 9–17)
AST SERPL-CCNC: 26 U/L
BILIRUB SERPL-MCNC: 1.36 MG/DL (ref 0.3–1.2)
BUN BLDV-MCNC: 19 MG/DL (ref 6–20)
BUN/CREAT BLD: 19 (ref 9–20)
CALCIUM SERPL-MCNC: 10 MG/DL (ref 8.6–10.4)
CHLORIDE BLD-SCNC: 98 MMOL/L (ref 98–107)
CO2: 29 MMOL/L (ref 20–31)
CREAT SERPL-MCNC: 1.02 MG/DL (ref 0.7–1.2)
GFR AFRICAN AMERICAN: >60 ML/MIN
GFR NON-AFRICAN AMERICAN: >60 ML/MIN
GFR SERPL CREATININE-BSD FRML MDRD: ABNORMAL ML/MIN/{1.73_M2}
GFR SERPL CREATININE-BSD FRML MDRD: ABNORMAL ML/MIN/{1.73_M2}
GLUCOSE BLD-MCNC: 101 MG/DL (ref 70–99)
POTASSIUM SERPL-SCNC: 4.3 MMOL/L (ref 3.7–5.3)
SODIUM BLD-SCNC: 137 MMOL/L (ref 135–144)
TOTAL PROTEIN: 7.5 G/DL (ref 6.4–8.3)

## 2021-01-07 PROCEDURE — 80053 COMPREHEN METABOLIC PANEL: CPT

## 2021-01-07 PROCEDURE — 36415 COLL VENOUS BLD VENIPUNCTURE: CPT

## 2021-01-12 ENCOUNTER — NURSE ONLY (OUTPATIENT)
Dept: LAB | Age: 31
End: 2021-01-12
Payer: COMMERCIAL

## 2021-01-12 ENCOUNTER — OFFICE VISIT (OUTPATIENT)
Dept: FAMILY MEDICINE CLINIC | Age: 31
End: 2021-01-12
Payer: COMMERCIAL

## 2021-01-12 VITALS
WEIGHT: 204.2 LBS | BODY MASS INDEX: 30.24 KG/M2 | HEART RATE: 86 BPM | SYSTOLIC BLOOD PRESSURE: 128 MMHG | RESPIRATION RATE: 16 BRPM | DIASTOLIC BLOOD PRESSURE: 84 MMHG | HEIGHT: 69 IN

## 2021-01-12 DIAGNOSIS — Z11.59 NEED FOR HEPATITIS C SCREENING TEST: ICD-10-CM

## 2021-01-12 DIAGNOSIS — R73.01 ELEVATED FASTING GLUCOSE: Primary | ICD-10-CM

## 2021-01-12 DIAGNOSIS — Z23 NEED FOR VARICELLA VACCINE: ICD-10-CM

## 2021-01-12 DIAGNOSIS — I10 ESSENTIAL HYPERTENSION: ICD-10-CM

## 2021-01-12 PROCEDURE — 99213 OFFICE O/P EST LOW 20 MIN: CPT | Performed by: FAMILY MEDICINE

## 2021-01-12 PROCEDURE — 90716 VAR VACCINE LIVE SUBQ: CPT | Performed by: FAMILY MEDICINE

## 2021-01-12 PROCEDURE — 90471 IMMUNIZATION ADMIN: CPT | Performed by: FAMILY MEDICINE

## 2021-01-12 RX ORDER — AMLODIPINE BESYLATE AND BENAZEPRIL HYDROCHLORIDE 10; 40 MG/1; MG/1
CAPSULE ORAL
Qty: 90 CAPSULE | Refills: 1 | Status: SHIPPED | OUTPATIENT
Start: 2021-01-12 | End: 2021-08-03 | Stop reason: SDUPTHER

## 2021-01-12 ASSESSMENT — PATIENT HEALTH QUESTIONNAIRE - PHQ9
SUM OF ALL RESPONSES TO PHQ QUESTIONS 1-9: 0
SUM OF ALL RESPONSES TO PHQ QUESTIONS 1-9: 0

## 2021-01-12 NOTE — PROGRESS NOTES
[address], [address] [place] [address] Drive                        Telephone [phone]             Fax [phone]     Steve Floyd  1990  MRN:  [id number]  Date of visit:  1/12/2021    Subjective:    Steve Floyd is a 26 y.o.  male who presents to [facility] today (1/12/2021) for follow up/evaluation of: Annual Exam and Hypertension      He states that he has been feeling well. He is tolerating his medications well. He states that he has recently started exercising. He denies chest pain or shortness of breath with activity or at rest.  He has no new concerns or complaints today. He has the following problem list:  Patient Active Problem List   Diagnosis    Bilateral renal cysts    Hypercalcemia    Essential hypertension    Overweight (BMI 25.0-29. 9)        Current medications are:  Outpatient Medications Marked as Taking for the 1/12/21 encounter (Office Visit) with Akua Avery MD   Medication Sig Dispense Refill    amLODIPine-benazepril (LOTREL) 10-40 MG per capsule TAKE 1 CAPSULE DAILY 90 capsule 1    Multiple Vitamins-Minerals (MULTIVITAMIN PO) Take 1 tablet by mouth daily. He has No Known Allergies. He  reports that he has never smoked. He has never used smokeless tobacco.      Objective:    Vitals:    01/12/21 0915   BP: 128/84   Site: Right Upper Arm   Position: Sitting   Cuff Size: Large Adult   Pulse: 86   Resp: 16   Weight: 204 lb 3.2 oz (92.6 kg)   Height: 5' 9\" (1.753 m)     Body mass index is 30.16 kg/m². Obese  male, healthy-appearing, alert, cooperative and in no distress. Neck supple. No adenopathy. Thyroid symmetric, normal size. Chest:  Normal expansion. Clear to auscultation. No rales, rhonchi, or wheezing. Heart sounds are normal.  Regular rate and rhythm without murmur, gallop or rub.   Lower extremities have Panel; Future    2. Elevated fasting glucose  His fasting glucose was elevated. I encouraged him to continue exercising regularly. Labs were ordered to be done prior to his return visit in 6 months:  - Lipid, Fasting; Future  - Hemoglobin A1C; Future    Printed information regarding Prediabetes was provided to the patient with his after visit summary. 3. Routine health maintenance  Health maintenance was reviewed with the patient. He has received an influenza vaccine this influenza season. Varicella vaccination was recommended and ordered. Hepatitis C screening was recommended and ordered. All other health maintenance, including Tdap, is up to date at this time. There is no indication for Pneumovax or Prevnar-13 at this time.      (Please note that portions of this note were completed with a voice-recognition program. Efforts were made to edit the dictation but occasionally words are mis-transcribed.)

## 2021-01-12 NOTE — PATIENT INSTRUCTIONS
Patient Education        Prediabetes: Care Instructions  Overview     Prediabetes is a warning sign that you're at risk for getting type 2 diabetes. It means that your blood sugar is higher than it should be. But it's not high enough to be diabetes. The food you eat naturally turns into sugar. Your body uses the sugar for energy. Normally, an organ called the pancreas makes insulin. And insulin allows the sugar in your blood to get into your body's cells. But sometimes the body can't use insulin the right way. So the sugar stays in your blood instead. This is called insulin resistance. The buildup of sugar in your blood means you have prediabetes. The good news is that you may be able to prevent or delay diabetes. Making small lifestyle changes, like getting active and changing your eating habits, may help you get your blood sugar back to normal. You can work with your doctor to make a treatment plan. Follow-up care is a key part of your treatment and safety. Be sure to make and go to all appointments, and call your doctor if you are having problems. It's also a good idea to know your test results and keep a list of the medicines you take. How can you care for yourself at home? · Watch your weight. A healthy weight helps your body use insulin properly. · Limit the amount of calories, sweets, and unhealthy fat you eat. Ask your doctor if you should see a dietitian. A registered dietitian can help you create meal plans that fit your lifestyle. · Get at least 30 minutes of exercise on most days of the week. Exercise helps control your blood sugar. It also helps you maintain a healthy weight. Walking is a good choice. You also may want to do other activities, such as running, swimming, cycling, or playing tennis or team sports. · Do not smoke. Smoking can make prediabetes worse. If you need help quitting, talk to your doctor about stop-smoking programs and medicines.  These can increase your chances of quitting for good. · If your doctor prescribed medicines, take them exactly as prescribed. Call your doctor if you think you are having a problem with your medicine. You will get more details on the specific medicines your doctor prescribes. When should you call for help? Watch closely for changes in your health, and be sure to contact your doctor if:    · You have any symptoms of diabetes. These may include:  ? Being thirsty more often. ? Urinating more. ? Being hungrier. ? Losing weight. ? Being very tired. ? Having blurry vision.     · You have a wound that will not heal.     · You have an infection that will not go away.     · You have problems with your blood pressure.     · You want more information about diabetes and how you can keep from getting it. Where can you learn more? Go to [email or web address]://[email or web address]. org and sign in to your [employer] account. Enter I222 in the Rock[employer] box to learn more about \"Prediabetes: Care Instructions. \"     If you do not have an account, please click on the \"Sign Up Now\" link. Current as of: December 20, 2019               Content Version: 12.6  © 2268-5761 [employer]. Care instructions adapted under license by [facility] ([facility]). If you have questions about a medical condition or this instruction, always ask your healthcare professional. [address] any warranty or liability for your use of this information.

## 2021-08-03 ENCOUNTER — PATIENT MESSAGE (OUTPATIENT)
Dept: FAMILY MEDICINE CLINIC | Age: 31
End: 2021-08-03

## 2021-08-03 DIAGNOSIS — I10 ESSENTIAL HYPERTENSION: ICD-10-CM

## 2021-08-03 RX ORDER — AMLODIPINE BESYLATE AND BENAZEPRIL HYDROCHLORIDE 10; 40 MG/1; MG/1
CAPSULE ORAL
Qty: 90 CAPSULE | Refills: 0 | Status: SHIPPED | OUTPATIENT
Start: 2021-08-03 | End: 2021-09-01 | Stop reason: SDUPTHER

## 2021-08-03 NOTE — TELEPHONE ENCOUNTER
From: Tarah Frost  To: Marlena Smallwood MD  Sent: 8/3/2021 2:09 AM EDT  Subject: Prescription Question    I had an appointment scheduled for July 14th, but ended up having to reschedule it for September 1st. Due to this rescheduling, I am running low on my blood pressure medication since the prescription would normally have been renewed back in July. I currently have 11 capsules left so I have enough medication to last through August 13th. Would it be possible to have the prescription renewed as usual through the [address] [place] [address] before my appointment or maybe have a prescription for a 30 day supply sent to the  [facility] [place] [facility] here in [place]? I have attached an image of the blood pressure medication in question. Thank you.

## 2021-08-26 ENCOUNTER — HOSPITAL ENCOUNTER (OUTPATIENT)
Dept: LAB | Age: 31
Discharge: HOME | End: 2021-08-26
Payer: COMMERCIAL

## 2021-08-26 DIAGNOSIS — R73.01 ELEVATED FASTING GLUCOSE: ICD-10-CM

## 2021-08-26 DIAGNOSIS — Z11.59 NEED FOR HEPATITIS C SCREENING TEST: ICD-10-CM

## 2021-08-26 DIAGNOSIS — I10 ESSENTIAL HYPERTENSION: ICD-10-CM

## 2021-08-26 LAB
ALBUMIN SERPL-MCNC: 5.4 G/DL (ref 3.5–5.2)
ALBUMIN/GLOBULIN RATIO: 2.5 (ref 1–2.5)
ALP BLD-CCNC: 41 U/L (ref 40–129)
ALT SERPL-CCNC: 32 U/L (ref 5–41)
ANION GAP SERPL CALCULATED.3IONS-SCNC: 13 MMOL/L (ref 9–17)
AST SERPL-CCNC: 25 U/L
BILIRUB SERPL-MCNC: 1.47 MG/DL (ref 0.3–1.2)
BUN BLDV-MCNC: 19 MG/DL (ref 6–20)
BUN/CREAT BLD: 20 (ref 9–20)
CALCIUM SERPL-MCNC: 10.1 MG/DL (ref 8.6–10.4)
CHLORIDE BLD-SCNC: 101 MMOL/L (ref 98–107)
CHOLESTEROL, FASTING: 131 MG/DL
CHOLESTEROL/HDL RATIO: 2.4
CO2: 25 MMOL/L (ref 20–31)
CREAT SERPL-MCNC: 0.97 MG/DL (ref 0.7–1.2)
ESTIMATED AVERAGE GLUCOSE: 71 MG/DL
GFR AFRICAN AMERICAN: >60 ML/MIN
GFR NON-AFRICAN AMERICAN: >60 ML/MIN
GFR SERPL CREATININE-BSD FRML MDRD: ABNORMAL ML/MIN/{1.73_M2}
GFR SERPL CREATININE-BSD FRML MDRD: ABNORMAL ML/MIN/{1.73_M2}
GLUCOSE BLD-MCNC: 86 MG/DL (ref 70–99)
HBA1C MFR BLD: 4.1 % (ref 4–6)
HDLC SERPL-MCNC: 55 MG/DL
HEPATITIS C ANTIBODY: NONREACTIVE
LDL CHOLESTEROL: 65 MG/DL (ref 0–130)
POTASSIUM SERPL-SCNC: 4.3 MMOL/L (ref 3.7–5.3)
SODIUM BLD-SCNC: 139 MMOL/L (ref 135–144)
TOTAL PROTEIN: 7.6 G/DL (ref 6.4–8.3)
TRIGLYCERIDE, FASTING: 54 MG/DL
VLDLC SERPL CALC-MCNC: NORMAL MG/DL (ref 1–30)

## 2021-08-26 PROCEDURE — 80061 LIPID PANEL: CPT

## 2021-08-26 PROCEDURE — 83036 HEMOGLOBIN GLYCOSYLATED A1C: CPT

## 2021-08-26 PROCEDURE — 86803 HEPATITIS C AB TEST: CPT

## 2021-08-26 PROCEDURE — 80053 COMPREHEN METABOLIC PANEL: CPT

## 2021-08-26 PROCEDURE — 36415 COLL VENOUS BLD VENIPUNCTURE: CPT

## 2021-09-01 ENCOUNTER — OFFICE VISIT (OUTPATIENT)
Dept: FAMILY MEDICINE CLINIC | Age: 31
End: 2021-09-01
Payer: COMMERCIAL

## 2021-09-01 VITALS
WEIGHT: 186.4 LBS | HEART RATE: 78 BPM | HEIGHT: 69 IN | SYSTOLIC BLOOD PRESSURE: 132 MMHG | BODY MASS INDEX: 27.61 KG/M2 | RESPIRATION RATE: 16 BRPM | DIASTOLIC BLOOD PRESSURE: 64 MMHG

## 2021-09-01 DIAGNOSIS — I10 ESSENTIAL HYPERTENSION: Primary | ICD-10-CM

## 2021-09-01 PROCEDURE — 99213 OFFICE O/P EST LOW 20 MIN: CPT | Performed by: FAMILY MEDICINE

## 2021-09-01 RX ORDER — AMLODIPINE BESYLATE AND BENAZEPRIL HYDROCHLORIDE 10; 40 MG/1; MG/1
CAPSULE ORAL
Qty: 90 CAPSULE | Refills: 1 | Status: SHIPPED | OUTPATIENT
Start: 2021-09-01 | End: 2022-04-12 | Stop reason: SDUPTHER

## 2021-09-01 ASSESSMENT — PATIENT HEALTH QUESTIONNAIRE - PHQ9
2. FEELING DOWN, DEPRESSED OR HOPELESS: 0
SUM OF ALL RESPONSES TO PHQ QUESTIONS 1-9: 0
1. LITTLE INTEREST OR PLEASURE IN DOING THINGS: 0
SUM OF ALL RESPONSES TO PHQ QUESTIONS 1-9: 0
SUM OF ALL RESPONSES TO PHQ9 QUESTIONS 1 & 2: 0
SUM OF ALL RESPONSES TO PHQ QUESTIONS 1-9: 0

## 2021-09-01 NOTE — PROGRESS NOTES
[address] [place], [address]                        Telephone [phone]             Fax [phone]     Mikel Phan  1990  MRN:  [id number]  Date of visit:  9/1/2021    Subjective:    Mikel Phan is a 26 y.o. male who presents to [facility] today (9/1/2021) for follow up/evaluation of:  Hypertension and Hyperglycemia    He states that he has been feeling well. He is tolerating his medications well. He reports that he does not have a regular exercise program.  He states that he has been more active at work. He has been trying to avoid soda, and he has been watching portion sizes. He has no new concerns or complaints today. He has received the [employer] Covid-19 vaccine. He has the following problem list:  Patient Active Problem List   Diagnosis    Bilateral renal cysts    Hypercalcemia    Essential hypertension    Overweight (BMI 25.0-29. 9)        Current medications are:  Outpatient Medications Marked as Taking for the 9/1/21 encounter (Office Visit) with Carmelita Harrison MD   Medication Sig Dispense Refill    amLODIPine-benazepril (LOTREL) 10-40 MG per capsule TAKE 1 CAPSULE DAILY 90 capsule 0    Multiple Vitamins-Minerals (MULTIVITAMIN PO) Take 1 tablet by mouth daily. He has No Known Allergies. He  reports that he has never smoked. He has never used smokeless tobacco.      Objective:    Vitals:    09/01/21 0933   BP: 132/64   Site: Right Upper Arm   Position: Sitting   Cuff Size: Large Adult   Pulse: 78   Resp: 16   Weight: 186 lb 6.4 oz (84.6 kg)   Height: 5' 9\" (1.753 m)     Body mass index is 27.53 kg/m². Overweight male, healthy-appearing, alert, cooperative and in no acute distress. Neck supple. No adenopathy. Thyroid symmetric, normal size. Chest:  Normal expansion. Clear to auscultation.   No rales, rhonchi, or wheezing. Heart sounds are normal.  Regular rate and rhythm without murmur, gallop or rub. Lower extremities have no edema. Results of labs done 8/26/2021 were reviewed with the patient:   Hospital Outpatient Visit on 08/26/2021   Component Date Value Ref Range Status    Hepatitis C Ab 08/26/2021 NONREACTIVE  NONREACTIVE Final    Comment:       The hepatitis C procedure used in our laboratory is a Chemiluminescent test specific for   three recombinant HCV antigens. A negative anti-HCV result indicates that the antibodies to   hepatitis C virus are not present at this time. Individuals with reactive anti-HCV should be considered infected and infectious until proven   otherwise. Confirmation of all equivocal or reactive results is recommended by ordering   HCV RNA by PCR.  Hemoglobin A1C 08/26/2021 4.1  4.0 - 6.0 % Final    Estimated Avg Glucose 08/26/2021 71  mg/dL Final    Comment: The ADA and AACC recommend providing the estimated average glucose result to permit better   patient understanding of their HBA1c result.       Glucose 08/26/2021 86  70 - 99 mg/dL Final    BUN 08/26/2021 19  6 - 20 mg/dL Final    CREATININE 08/26/2021 0.97  0.70 - 1.20 mg/dL Final    Bun/Cre Ratio 08/26/2021 20  9 - 20 Final    Calcium 08/26/2021 10.1  8.6 - 10.4 mg/dL Final    Sodium 08/26/2021 139  135 - 144 mmol/L Final    Potassium 08/26/2021 4.3  3.7 - 5.3 mmol/L Final    Chloride 08/26/2021 101  98 - 107 mmol/L Final    CO2 08/26/2021 25  20 - 31 mmol/L Final    Anion Gap 08/26/2021 13  9 - 17 mmol/L Final    Alkaline Phosphatase 08/26/2021 41  40 - 129 U/L Final    ALT 08/26/2021 32  5 - 41 U/L Final    AST 08/26/2021 25  <40 U/L Final    Total Bilirubin 08/26/2021 1.47* 0.3 - 1.2 mg/dL Final    Total Protein 08/26/2021 7.6  6.4 - 8.3 g/dL Final    Albumin 08/26/2021 5.4* 3.5 - 5.2 g/dL Final    Albumin/Globulin Ratio 08/26/2021 2.5  1.0 - 2.5 Final    GFR Non- 08/26/2021 >60  >60 [employer] Covid-19 vaccine. He was advised that a second dose of [employer] may be recommended. Annual influenza vaccine was recommended. All other health maintenance, including Tdap, is up to date at this time. There is no indication for Pneumovax or Prevnar-13 at this time.      (Please note that portions of this note were completed with a voice-recognition program. Efforts were made to edit the dictation but occasionally words are mis-transcribed.)

## 2022-04-07 ENCOUNTER — HOSPITAL ENCOUNTER (OUTPATIENT)
Dept: LAB | Age: 32
Discharge: HOME | End: 2022-04-07
Payer: COMMERCIAL

## 2022-04-07 DIAGNOSIS — I10 ESSENTIAL HYPERTENSION: ICD-10-CM

## 2022-04-07 LAB
ALBUMIN SERPL-MCNC: 5.4 G/DL (ref 3.5–5.2)
ALBUMIN/GLOBULIN RATIO: 2.5 (ref 1–2.5)
ALP BLD-CCNC: 45 U/L (ref 40–129)
ALT SERPL-CCNC: 72 U/L (ref 5–41)
ANION GAP SERPL CALCULATED.3IONS-SCNC: 10 MMOL/L (ref 9–17)
AST SERPL-CCNC: 37 U/L
BILIRUB SERPL-MCNC: 1.28 MG/DL (ref 0.3–1.2)
BUN BLDV-MCNC: 14 MG/DL (ref 6–20)
BUN/CREAT BLD: 13 (ref 9–20)
CALCIUM SERPL-MCNC: 10 MG/DL (ref 8.6–10.4)
CHLORIDE BLD-SCNC: 101 MMOL/L (ref 98–107)
CO2: 28 MMOL/L (ref 20–31)
CREAT SERPL-MCNC: 1.05 MG/DL (ref 0.7–1.2)
GFR AFRICAN AMERICAN: >60 ML/MIN
GFR NON-AFRICAN AMERICAN: >60 ML/MIN
GFR SERPL CREATININE-BSD FRML MDRD: ABNORMAL ML/MIN/{1.73_M2}
GLUCOSE BLD-MCNC: 101 MG/DL (ref 70–99)
POTASSIUM SERPL-SCNC: 4.6 MMOL/L (ref 3.7–5.3)
SODIUM BLD-SCNC: 139 MMOL/L (ref 135–144)
TOTAL PROTEIN: 7.6 G/DL (ref 6.4–8.3)

## 2022-04-07 PROCEDURE — 80053 COMPREHEN METABOLIC PANEL: CPT

## 2022-04-07 PROCEDURE — 36415 COLL VENOUS BLD VENIPUNCTURE: CPT

## 2022-04-12 ENCOUNTER — OFFICE VISIT (OUTPATIENT)
Dept: FAMILY MEDICINE CLINIC | Age: 32
End: 2022-04-12
Payer: COMMERCIAL

## 2022-04-12 VITALS
DIASTOLIC BLOOD PRESSURE: 68 MMHG | SYSTOLIC BLOOD PRESSURE: 126 MMHG | BODY MASS INDEX: 30.78 KG/M2 | TEMPERATURE: 97.6 F | HEIGHT: 69 IN | HEART RATE: 90 BPM | OXYGEN SATURATION: 96 % | WEIGHT: 207.8 LBS

## 2022-04-12 DIAGNOSIS — R73.01 ELEVATED FASTING GLUCOSE: ICD-10-CM

## 2022-04-12 DIAGNOSIS — I10 ESSENTIAL HYPERTENSION: Primary | ICD-10-CM

## 2022-04-12 PROCEDURE — 99214 OFFICE O/P EST MOD 30 MIN: CPT | Performed by: FAMILY MEDICINE

## 2022-04-12 RX ORDER — AMLODIPINE BESYLATE AND BENAZEPRIL HYDROCHLORIDE 10; 40 MG/1; MG/1
CAPSULE ORAL
Qty: 90 CAPSULE | Refills: 1 | Status: SHIPPED | OUTPATIENT
Start: 2022-04-12 | End: 2022-10-26 | Stop reason: SDUPTHER

## 2022-04-12 SDOH — ECONOMIC STABILITY: FOOD INSECURITY: WITHIN THE PAST 12 MONTHS, THE FOOD YOU BOUGHT JUST DIDN'T LAST AND YOU DIDN'T HAVE MONEY TO GET MORE.: NEVER TRUE

## 2022-04-12 SDOH — ECONOMIC STABILITY: FOOD INSECURITY: WITHIN THE PAST 12 MONTHS, YOU WORRIED THAT YOUR FOOD WOULD RUN OUT BEFORE YOU GOT MONEY TO BUY MORE.: NEVER TRUE

## 2022-04-12 ASSESSMENT — SOCIAL DETERMINANTS OF HEALTH (SDOH): HOW HARD IS IT FOR YOU TO PAY FOR THE VERY BASICS LIKE FOOD, HOUSING, MEDICAL CARE, AND HEATING?: NOT HARD AT ALL

## 2022-04-12 NOTE — PROGRESS NOTES
[address], [address] [address], [address]                        Telephone [phone]             Fax [phone]       Jomar South  :  1990  Age:  33 y.o. MRN:  [id number]  Date of visit:  2022       Assessment and Plan:    1. Essential hypertension  His blood pressure is well-controlled today. (BP: 126/68)   He was advised to continue current medications. Amlodipine-Benazepril was refilled:   - amLODIPine-benazepril (LOTREL) 10-40 MG per capsule; TAKE 1 CAPSULE DAILY  Dispense: 90 capsule; Refill: 1      Labs were ordered to be done prior to his return visit in 6 months:   - Comprehensive Metabolic Panel; Future  - Lipid Panel; Future    2. Elevated fasting glucose  I recommended that he avoid highly processed carbohydrates, begin a regular exercise program, and attempt to lose weight.     - Hemoglobin A1C; Future was ordered to be done prior to his return visit in 6 months. 3.  Routine health maintenance  Health maintenance was reviewed with the patient. He has received one dose of the [employer] Covid-19 vaccine, and one dose of the Moderna Covid-19 vaccine. He was advised to receive an additional dose of the Moderna Covid-19 vaccine. All other health maintenance, including Tdap, is up to date at this time. There is no indication for pneumonia vaccination at this time. Follow up instructions were given to the patient:  Return in about 6 months (around 10/12/2022) for hypertension, elevated glucose. Subjective:    Jomar South is a 33 y.o. male who presents to [facility] today (2022) for follow up/evaluation of:  6 Month Follow-Up and Hypertension      He states that he has been feeling well. He is tolerating his medications well.    He denies chest pain or shortness of breath with activity or at rest.   He reports that he tries to exercise on weekends. He reports that he has gained some weight, which he attributes to drinking soda. He is trying to limit his soda consumption. He has received one dose of the [employer] Covid-19 vaccine. He also had a booster dose of the Moderna Covid-19 vaccine in November 2021. Immunization history was reviewed:  Immunization History   Administered Date(s) Administered    COVID-19, J&J, PF, 0.5 mL 04/09/2021    Hepatitis B vaccine 10/31/2000, 12/18/2000, 03/29/2001    Influenza Virus Vaccine 12/01/2013, 11/01/2014, 11/01/2015, 10/01/2016, 10/16/2018    MMR 01/30/2001, 06/27/2001    Meningococcal MCV4P (Menactra) 05/03/2006    Tdap (Boostrix, Adacel) 04/14/2006, 06/26/2019    Varicella (Varivax) 08/14/1998, 01/12/2021          He has the following problem list:  Patient Active Problem List   Diagnosis    Bilateral renal cysts    Hypercalcemia    Essential hypertension    Overweight (BMI 25.0-29. 9)        Current medications are:  Outpatient Medications Marked as Taking for the 4/12/22 encounter (Office Visit) with Ruben Brewer MD   Medication Sig Dispense Refill    amLODIPine-benazepril (LOTREL) 10-40 MG per capsule TAKE 1 CAPSULE DAILY 90 capsule 1    Multiple Vitamins-Minerals (MULTIVITAMIN PO) Take 1 tablet by mouth daily. He has No Known Allergies. He  reports that he has never smoked. He has never used smokeless tobacco.      Objective:    Vitals:    04/12/22 1008   BP: 126/68   Site: Right Upper Arm   Position: Sitting   Cuff Size: Large Adult   Pulse: 90   Temp: 97.6 °F (36.4 °C)   TempSrc: Temporal   SpO2: 96%   Weight: 207 lb 12.8 oz (94.3 kg)   Height: 5' 9\" (1.753 m)     Body mass index is 30.69 kg/m². Obese male, healthy-appearing, alert, cooperative and in no acute distress. Neck supple. No adenopathy. Thyroid symmetric, normal size. Chest:  Normal expansion. Clear to auscultation. No rales, rhonchi, or wheezing.   Heart sounds are normal. Regular rate and rhythm without murmur, gallop or rub. Lower extremities have no edema. Results of labs done 4/7/2022 were reviewed with the patient:   Hospital Outpatient Visit on 04/07/2022   Component Date Value Ref Range Status    Glucose 04/07/2022 101* 70 - 99 mg/dL Final    BUN 04/07/2022 14  6 - 20 mg/dL Final    CREATININE 04/07/2022 1.05  0.70 - 1.20 mg/dL Final    Bun/Cre Ratio 04/07/2022 13  9 - 20 Final    Calcium 04/07/2022 10.0  8.6 - 10.4 mg/dL Final    Sodium 04/07/2022 139  135 - 144 mmol/L Final    Potassium 04/07/2022 4.6  3.7 - 5.3 mmol/L Final    Chloride 04/07/2022 101  98 - 107 mmol/L Final    CO2 04/07/2022 28  20 - 31 mmol/L Final    Anion Gap 04/07/2022 10  9 - 17 mmol/L Final    Alkaline Phosphatase 04/07/2022 45  40 - 129 U/L Final    ALT 04/07/2022 72* 5 - 41 U/L Final    AST 04/07/2022 37  <40 U/L Final    Total Bilirubin 04/07/2022 1.28* 0.3 - 1.2 mg/dL Final    Total Protein 04/07/2022 7.6  6.4 - 8.3 g/dL Final    Albumin 04/07/2022 5.4* 3.5 - 5.2 g/dL Final    Albumin/Globulin Ratio 04/07/2022 2.5  1.0 - 2.5 Final    GFR Non- 04/07/2022 >60  >60 mL/min Final    GFR  04/07/2022 >60  >60 mL/min Final    GFR Comment 04/07/2022        Final    Comment: Average GFR for 30-36 years old:   80 mL/min/1.73sq m  Chronic Kidney Disease:   <60 mL/min/1.73sq m  Kidney failure:   <15 mL/min/1.73sq m              eGFR calculated using average adult body mass.  Additional eGFR calculator available at:        [employer]                     (Please note that portions of this note were completed with a voice-recognition program. Efforts were made to edit the dictation but occasionally words are mis-transcribed.)

## 2022-10-19 ENCOUNTER — HOSPITAL ENCOUNTER (OUTPATIENT)
Dept: LAB | Age: 32
Discharge: HOME | End: 2022-10-19
Payer: COMMERCIAL

## 2022-10-19 DIAGNOSIS — I10 ESSENTIAL HYPERTENSION: ICD-10-CM

## 2022-10-19 DIAGNOSIS — R73.01 ELEVATED FASTING GLUCOSE: ICD-10-CM

## 2022-10-19 LAB
ALBUMIN SERPL-MCNC: 5.4 G/DL (ref 3.5–5.2)
ALBUMIN/GLOBULIN RATIO: 2.5 (ref 1–2.5)
ALP BLD-CCNC: 47 U/L (ref 40–129)
ALT SERPL-CCNC: 83 U/L (ref 5–41)
ANION GAP SERPL CALCULATED.3IONS-SCNC: 12 MMOL/L (ref 9–17)
AST SERPL-CCNC: 46 U/L
BILIRUB SERPL-MCNC: 1.3 MG/DL (ref 0.3–1.2)
BUN BLDV-MCNC: 19 MG/DL (ref 6–20)
BUN/CREAT BLD: 19 (ref 9–20)
CALCIUM SERPL-MCNC: 10 MG/DL (ref 8.6–10.4)
CHLORIDE BLD-SCNC: 101 MMOL/L (ref 98–107)
CHOLESTEROL/HDL RATIO: 3.3
CHOLESTEROL: 149 MG/DL
CO2: 26 MMOL/L (ref 20–31)
CREAT SERPL-MCNC: 0.98 MG/DL (ref 0.7–1.2)
ESTIMATED AVERAGE GLUCOSE: 82 MG/DL
GFR SERPL CREATININE-BSD FRML MDRD: >60 ML/MIN/1.73M2
GLUCOSE BLD-MCNC: 89 MG/DL (ref 70–99)
HBA1C MFR BLD: 4.5 % (ref 4–6)
HDLC SERPL-MCNC: 45 MG/DL
LDL CHOLESTEROL: 87 MG/DL (ref 0–130)
POTASSIUM SERPL-SCNC: 4.4 MMOL/L (ref 3.7–5.3)
SODIUM BLD-SCNC: 139 MMOL/L (ref 135–144)
TOTAL PROTEIN: 7.6 G/DL (ref 6.4–8.3)
TRIGL SERPL-MCNC: 84 MG/DL

## 2022-10-19 PROCEDURE — 80061 LIPID PANEL: CPT

## 2022-10-19 PROCEDURE — 80053 COMPREHEN METABOLIC PANEL: CPT

## 2022-10-19 PROCEDURE — 83036 HEMOGLOBIN GLYCOSYLATED A1C: CPT

## 2022-10-19 PROCEDURE — 36415 COLL VENOUS BLD VENIPUNCTURE: CPT

## 2022-10-26 ENCOUNTER — OFFICE VISIT (OUTPATIENT)
Dept: FAMILY MEDICINE CLINIC | Age: 32
End: 2022-10-26
Payer: COMMERCIAL

## 2022-10-26 VITALS
WEIGHT: 208.6 LBS | HEIGHT: 69 IN | BODY MASS INDEX: 30.9 KG/M2 | OXYGEN SATURATION: 100 % | SYSTOLIC BLOOD PRESSURE: 132 MMHG | HEART RATE: 91 BPM | DIASTOLIC BLOOD PRESSURE: 82 MMHG

## 2022-10-26 DIAGNOSIS — I10 ESSENTIAL HYPERTENSION: Primary | ICD-10-CM

## 2022-10-26 DIAGNOSIS — R74.8 ELEVATED LIVER ENZYMES: ICD-10-CM

## 2022-10-26 PROCEDURE — 3078F DIAST BP <80 MM HG: CPT | Performed by: STUDENT IN AN ORGANIZED HEALTH CARE EDUCATION/TRAINING PROGRAM

## 2022-10-26 PROCEDURE — 3074F SYST BP LT 130 MM HG: CPT | Performed by: STUDENT IN AN ORGANIZED HEALTH CARE EDUCATION/TRAINING PROGRAM

## 2022-10-26 PROCEDURE — 99214 OFFICE O/P EST MOD 30 MIN: CPT | Performed by: STUDENT IN AN ORGANIZED HEALTH CARE EDUCATION/TRAINING PROGRAM

## 2022-10-26 RX ORDER — AMLODIPINE BESYLATE AND BENAZEPRIL HYDROCHLORIDE 10; 40 MG/1; MG/1
CAPSULE ORAL
Qty: 90 CAPSULE | Refills: 1 | Status: SHIPPED | OUTPATIENT
Start: 2022-10-26

## 2022-10-26 ASSESSMENT — ENCOUNTER SYMPTOMS
ABDOMINAL PAIN: 0
EYE PAIN: 0
DIARRHEA: 0
VOMITING: 0
BLOOD IN STOOL: 0
NAUSEA: 0
TROUBLE SWALLOWING: 0
SHORTNESS OF BREATH: 0
COUGH: 0
CONSTIPATION: 0

## 2022-10-26 ASSESSMENT — PATIENT HEALTH QUESTIONNAIRE - PHQ9
SUM OF ALL RESPONSES TO PHQ QUESTIONS 1-9: 0
SUM OF ALL RESPONSES TO PHQ9 QUESTIONS 1 & 2: 0
1. LITTLE INTEREST OR PLEASURE IN DOING THINGS: 0
SUM OF ALL RESPONSES TO PHQ QUESTIONS 1-9: 0
2. FEELING DOWN, DEPRESSED OR HOPELESS: 0
SUM OF ALL RESPONSES TO PHQ QUESTIONS 1-9: 0
SUM OF ALL RESPONSES TO PHQ QUESTIONS 1-9: 0

## 2022-10-26 NOTE — PROGRESS NOTES
[address] [address]  [address] [place] [address]  Dept: [phone]  Dept Fax: [phone]  Loc: [phone]      Cuca Trevino is a 27 y.o. male who presents today for:  Chief Complaint   Patient presents with    Hypertension     RR pt, no concerns needing medication refilled        Goals    None         HPI:     HPI  Patient is a 53-year-old male who presents the office today for follow-up for essential hypertension. Patient does not check his blood pressure at home.,  Has been taking amlodipine benazepril combination medication for many years. Denies any side effects from the medication. States that he feels like the medication is working well for him. Denies any hypertensive symptoms. Blood pressure in the office today is 132/82. Recently had blood work completed hemoglobin A1c was normal, lipid panel essentially unremarkable. He did have a mild transaminitis with elevated ALT and AST and bilirubin. His previous blood work did show a mildly elevated ALT that has been borderline in the past.  Patient states that he does eat a significant amount of fatty food on a regular basis. Patient denies any yellowing of the skin, abdominal pain, other symptoms at this time. Denies any alcohol or Tylenol intake. Past Medical History:   Diagnosis Date    Bilateral renal cysts     Hypertension     Obesity 03/19/2013    198 LBS, 69\", BMI 29      Past Surgical History:   Procedure Laterality Date    CIRCUMCISION  12/29/1989     Family History   Problem Relation Age of Onset    High Blood Pressure Mother      Social History     Tobacco Use    Smoking status: Never    Smokeless tobacco: Never   Substance Use Topics    Alcohol use:  Yes     Alcohol/week: 0.0 standard drinks     Comment: rare      Current Outpatient Medications   Medication Sig Dispense Refill    amLODIPine-benazepril (LOTREL) 10-40 MG per capsule TAKE 1 CAPSULE DAILY 90 capsule 1    Multiple Vitamins-Minerals (MULTIVITAMIN PO) Take 1 tablet by mouth daily. No current facility-administered medications for this visit. No Known Allergies    Health Maintenance   Topic Date Due    COVID-19 Vaccine (3 - Booster for Mariel series) 01/15/2022    Depression Screen  10/26/2023    DTaP/Tdap/Td vaccine (3 - Td or Tdap) 06/26/2029    Varicella vaccine  Completed    Meningococcal (ACWY) vaccine  Completed    Flu vaccine  Completed    Hepatitis C screen  Completed    HIV screen  Completed    Hepatitis A vaccine  Aged Out    Hib vaccine  Aged Out    Pneumococcal 0-64 years Vaccine  Aged Out       Subjective:      Review of Systems   Constitutional:  Negative for chills, fatigue and fever. HENT:  Negative for ear pain, postnasal drip and trouble swallowing. Eyes:  Negative for pain and visual disturbance. Respiratory:  Negative for cough and shortness of breath. Cardiovascular:  Negative for chest pain and palpitations. Gastrointestinal:  Negative for abdominal pain, blood in stool, constipation, diarrhea, nausea and vomiting. Genitourinary:  Negative for dysuria and urgency. Skin:  Negative for rash and wound. Neurological:  Negative for dizziness and headaches. Psychiatric/Behavioral:  Negative for dysphoric mood. The patient is not nervous/anxious. Objective:     Vitals:    10/26/22 0854   BP: 132/82   Pulse: 91   SpO2: 100%   Weight: 208 lb 9.6 oz (94.6 kg)   Height: 5' 9\" (1.753 m)       Body mass index is 30.8 kg/m². Wt Readings from Last 3 Encounters:   10/26/22 208 lb 9.6 oz (94.6 kg)   04/12/22 207 lb 12.8 oz (94.3 kg)   09/01/21 186 lb 6.4 oz (84.6 kg)     BP Readings from Last 3 Encounters:   10/26/22 132/82   04/12/22 126/68   09/01/21 132/64       Physical Exam  Vitals and nursing note reviewed. Constitutional:       General: He is not in acute distress. Appearance: He is well-developed. He is not diaphoretic.    HENT: Head: Normocephalic and atraumatic. Right Ear: External ear normal.      Left Ear: External ear normal.      Nose: Nose normal.   Eyes:      General: No scleral icterus. Right eye: No discharge. Left eye: No discharge. Conjunctiva/sclera: Conjunctivae normal.   Cardiovascular:      Rate and Rhythm: Normal rate and regular rhythm. Heart sounds: Normal heart sounds. No murmur heard. Pulmonary:      Effort: Pulmonary effort is normal.      Breath sounds: Normal breath sounds. Abdominal:      General: Abdomen is flat. Palpations: Abdomen is soft. Musculoskeletal:      Cervical back: Normal range of motion. Skin:     General: Skin is warm and dry. Findings: No erythema or rash. Neurological:      Mental Status: He is alert and oriented to person, place, and time. Cranial Nerves: No cranial nerve deficit. Psychiatric:         Behavior: Behavior normal.         Thought Content: Thought content normal.         Judgment: Judgment normal.       Lab Results   Component Value Date    CHOL 149 10/19/2022    TRIG 84 10/19/2022    HDL 45 10/19/2022    AST 46 (H) 10/19/2022     10/19/2022    K 4.4 10/19/2022     10/19/2022    CREATININE 0.98 10/19/2022    BUN 19 10/19/2022    CO2 26 10/19/2022    GLUF 98 06/27/2019    LABA1C 4.5 10/19/2022    LABGLOM >60 10/19/2022    CALCIUM 10.0 10/19/2022       Imaging Results:    No results found. Assessment/Plan:     Luda Reynolds was seen today for hypertension. Diagnoses and all orders for this visit:    Essential hypertension  -     amLODIPine-benazepril (LOTREL) 10-40 MG per capsule; TAKE 1 CAPSULE DAILY    Elevated liver enzymes  -     Comprehensive Metabolic Panel; Future    Essential hypertension-chronic, controlled continue amlodipine benazepril once daily. Follow-up in the office in 6 months with PCP for reevaluation of essential hypertension. Consider home blood pressure monitoring for more data points.     Elevated liver enzymes-new, unclear etiology at this time. I did offer further blood work and imaging for evaluation of elevated liver enzymes at this time however patient would like to repeat a CMP in 6 months instead. States that he has been eating a lot of fatty foods and would like to try to exercise more and eat more fruits and vegetables in his diet prior to doing further work-up for his elevated liver enzymes. Discussed with patient that this is a reasonable approach and will order CMP in 6 months and have him follow-up with his PCP at that time. Return in about 6 months (around 4/26/2023). Medications Prescribed:  Orders Placed This Encounter   Medications    amLODIPine-benazepril (LOTREL) 10-40 MG per capsule     Sig: TAKE 1 CAPSULE DAILY     Dispense:  90 capsule     Refill:  1       Future Appointments   Date Time Provider Martha Martin   4/26/2023  9:00 AM Kelly Jamil MD [facility]       Patient given educational materials - see patient instructions. Discussed use, benefit, and sideeffects of prescribed medications. All patient questions answered. Pt voiced understanding. Reviewed health maintenance. Instructed to continue current medications, diet and exercise. Patient agreed with treatment plan. Follow up as directed.      Electronically signed by Rhoda Jamil DO on 10/26/2022 at 9:15 AM

## 2023-05-09 ENCOUNTER — HOSPITAL ENCOUNTER (OUTPATIENT)
Age: 33
Discharge: HOME | End: 2023-05-09
Payer: COMMERCIAL

## 2023-05-09 DIAGNOSIS — R74.8 ELEVATED LIVER ENZYMES: ICD-10-CM

## 2023-05-09 LAB
ALBUMIN SERPL-MCNC: 5.1 G/DL (ref 3.5–5.2)
ALBUMIN/GLOBULIN RATIO: 2.1 (ref 1–2.5)
ALP SERPL-CCNC: 48 U/L (ref 40–129)
ALT SERPL-CCNC: 69 U/L (ref 5–41)
ANION GAP SERPL CALCULATED.3IONS-SCNC: 10 MMOL/L (ref 9–17)
AST SERPL-CCNC: 36 U/L
BILIRUB SERPL-MCNC: 1.2 MG/DL (ref 0.3–1.2)
BUN SERPL-MCNC: 12 MG/DL (ref 6–20)
BUN/CREAT BLD: 12 (ref 9–20)
CALCIUM SERPL-MCNC: 10 MG/DL (ref 8.6–10.4)
CHLORIDE SERPL-SCNC: 101 MMOL/L (ref 98–107)
CO2 SERPL-SCNC: 29 MMOL/L (ref 20–31)
CREAT SERPL-MCNC: 1.02 MG/DL (ref 0.7–1.2)
GFR SERPL CREATININE-BSD FRML MDRD: >60 ML/MIN/1.73M2
GLUCOSE SERPL-MCNC: 97 MG/DL (ref 70–99)
POTASSIUM SERPL-SCNC: 4.5 MMOL/L (ref 3.7–5.3)
PROT SERPL-MCNC: 7.5 G/DL (ref 6.4–8.3)
SODIUM SERPL-SCNC: 140 MMOL/L (ref 135–144)

## 2023-05-09 PROCEDURE — 80053 COMPREHEN METABOLIC PANEL: CPT

## 2023-05-09 PROCEDURE — 36415 COLL VENOUS BLD VENIPUNCTURE: CPT

## 2023-05-12 ENCOUNTER — OFFICE VISIT (OUTPATIENT)
Dept: FAMILY MEDICINE CLINIC | Age: 33
End: 2023-05-12
Payer: COMMERCIAL

## 2023-05-12 ENCOUNTER — HOSPITAL ENCOUNTER (OUTPATIENT)
Age: 33
Discharge: HOME | End: 2023-05-12
Payer: COMMERCIAL

## 2023-05-12 VITALS
BODY MASS INDEX: 31.1 KG/M2 | HEART RATE: 75 BPM | OXYGEN SATURATION: 98 % | WEIGHT: 210 LBS | DIASTOLIC BLOOD PRESSURE: 78 MMHG | HEIGHT: 69 IN | SYSTOLIC BLOOD PRESSURE: 138 MMHG

## 2023-05-12 DIAGNOSIS — R74.8 ELEVATED LIVER ENZYMES: ICD-10-CM

## 2023-05-12 DIAGNOSIS — R73.01 ELEVATED FASTING GLUCOSE: ICD-10-CM

## 2023-05-12 DIAGNOSIS — I10 ESSENTIAL HYPERTENSION: ICD-10-CM

## 2023-05-12 DIAGNOSIS — I10 ESSENTIAL HYPERTENSION: Primary | ICD-10-CM

## 2023-05-12 LAB
ABSOLUTE EOS #: 0.2 K/UL (ref 0–0.44)
ABSOLUTE IMMATURE GRANULOCYTE: 0.05 K/UL (ref 0–0.3)
ABSOLUTE LYMPH #: 1.94 K/UL (ref 1.1–3.7)
ABSOLUTE MONO #: 0.39 K/UL (ref 0.1–1.2)
BASOPHILS # BLD: 2 % (ref 0–2)
BASOPHILS ABSOLUTE: 0.08 K/UL (ref 0–0.2)
CHOLEST SERPL-MCNC: 161 MG/DL
CHOLESTEROL/HDL RATIO: 3.8
EOSINOPHILS RELATIVE PERCENT: 4 % (ref 1–4)
EST. AVERAGE GLUCOSE BLD GHB EST-MCNC: 80 MG/DL
HBA1C MFR BLD: 4.4 % (ref 4–6)
HCT VFR BLD AUTO: 47.8 % (ref 40.7–50.3)
HDLC SERPL-MCNC: 42 MG/DL
HGB BLD-MCNC: 17.4 G/DL (ref 13–17)
IMMATURE GRANULOCYTES: 1 %
LDLC SERPL CALC-MCNC: 96 MG/DL (ref 0–130)
LYMPHOCYTES # BLD: 37 % (ref 24–43)
MCH RBC QN AUTO: 30.9 PG (ref 25.2–33.5)
MCHC RBC AUTO-ENTMCNC: 36.4 G/DL (ref 25.2–33.5)
MCV RBC AUTO: 84.8 FL (ref 82.6–102.9)
MONOCYTES # BLD: 7 % (ref 3–12)
NRBC AUTOMATED: 0 PER 100 WBC
PDW BLD-RTO: 12 % (ref 11.8–14.4)
PLATELET # BLD AUTO: 316 K/UL (ref 138–453)
PMV BLD AUTO: 8.6 FL (ref 8.1–13.5)
RBC # BLD: 5.64 M/UL (ref 4.21–5.77)
SEG NEUTROPHILS: 49 % (ref 36–65)
SEGMENTED NEUTROPHILS ABSOLUTE COUNT: 2.61 K/UL (ref 1.5–8.1)
TRIGL SERPL-MCNC: 114 MG/DL
WBC # BLD AUTO: 5.3 K/UL (ref 3.5–11.3)

## 2023-05-12 PROCEDURE — 83036 HEMOGLOBIN GLYCOSYLATED A1C: CPT

## 2023-05-12 PROCEDURE — 36415 COLL VENOUS BLD VENIPUNCTURE: CPT

## 2023-05-12 PROCEDURE — 80061 LIPID PANEL: CPT

## 2023-05-12 PROCEDURE — 99214 OFFICE O/P EST MOD 30 MIN: CPT | Performed by: FAMILY MEDICINE

## 2023-05-12 PROCEDURE — 85025 COMPLETE CBC W/AUTO DIFF WBC: CPT

## 2023-05-12 PROCEDURE — 3075F SYST BP GE 130 - 139MM HG: CPT | Performed by: FAMILY MEDICINE

## 2023-05-12 PROCEDURE — 3078F DIAST BP <80 MM HG: CPT | Performed by: FAMILY MEDICINE

## 2023-05-12 RX ORDER — AMLODIPINE BESYLATE AND BENAZEPRIL HYDROCHLORIDE 10; 40 MG/1; MG/1
CAPSULE ORAL
Qty: 90 CAPSULE | Refills: 1 | Status: SHIPPED | OUTPATIENT
Start: 2023-05-12

## 2023-05-12 SDOH — ECONOMIC STABILITY: HOUSING INSECURITY
IN THE LAST 12 MONTHS, WAS THERE A TIME WHEN YOU DID NOT HAVE A STEADY PLACE TO SLEEP OR SLEPT IN A SHELTER (INCLUDING NOW)?: PATIENT REFUSED

## 2023-05-12 SDOH — ECONOMIC STABILITY: FOOD INSECURITY: WITHIN THE PAST 12 MONTHS, YOU WORRIED THAT YOUR FOOD WOULD RUN OUT BEFORE YOU GOT MONEY TO BUY MORE.: PATIENT DECLINED

## 2023-05-12 SDOH — ECONOMIC STABILITY: FOOD INSECURITY: WITHIN THE PAST 12 MONTHS, THE FOOD YOU BOUGHT JUST DIDN'T LAST AND YOU DIDN'T HAVE MONEY TO GET MORE.: PATIENT DECLINED

## 2023-05-12 SDOH — ECONOMIC STABILITY: INCOME INSECURITY: HOW HARD IS IT FOR YOU TO PAY FOR THE VERY BASICS LIKE FOOD, HOUSING, MEDICAL CARE, AND HEATING?: PATIENT DECLINED

## 2023-05-12 ASSESSMENT — PATIENT HEALTH QUESTIONNAIRE - PHQ9
SUM OF ALL RESPONSES TO PHQ QUESTIONS 1-9: 0
SUM OF ALL RESPONSES TO PHQ9 QUESTIONS 1 & 2: 0
SUM OF ALL RESPONSES TO PHQ QUESTIONS 1-9: 0
1. LITTLE INTEREST OR PLEASURE IN DOING THINGS: 0
2. FEELING DOWN, DEPRESSED OR HOPELESS: 0

## 2023-05-12 NOTE — PROGRESS NOTES
[address], [address]                        Telephone [phone]             Fax [phone]       Ren Hutchison  :  1990  Age:  27 y.o. MRN:  [id number]  Date of visit:  2023       Assessment and Plan:    1. Essential hypertension  His blood pressure is marginally-controlled today. (BP: 138/78)   He was advised to continue current medications. Amlodipine-Benazepril was refilled:   - amLODIPine-benazepril (LOTREL) 10-40 MG per capsule; TAKE 1 CAPSULE DAILY  Dispense: 90 capsule; Refill: 1    - Lipid Panel; Future was ordered to be done today. He will be contacted when the results are available. 2. Elevated fasting glucose  - Hemoglobin A1C; Future was ordered to be done today. He will be contacted when the results are available. 3. Elevated liver enzymes  - CBC with Auto Differential; Future was ordered to be done today. He will be contacted when the results are available. 4.  Routine health maintenance  Health maintenance was reviewed with the patient. He has received one dose of the [employer] Covid-19 vaccine, and one dose of the Moderna Covid-19 vaccine. The updated bivalent Covid vaccine was recommended. All other health maintenance, including Tdap, is up to date at this time. There is no indication for pneumonia vaccination at this time. Follow up instructions were given to the patient:  Return in about 6 months (around 2023) for annual wellness visit, hypertension. Subjective:    Ren Hutchison is a 27 y.o. male who presents to [facility] today (2023) for follow up/evaluation of:  Hypertension and Blood Sugar Problem (Elevated fasting glucose)      He states that he has been feeling well. He is tolerating his medications well. He states that he does not exercise regularly.   He has no

## 2023-05-18 ENCOUNTER — TELEPHONE (OUTPATIENT)
Dept: FAMILY MEDICINE CLINIC | Age: 33
End: 2023-05-18

## 2023-05-18 DIAGNOSIS — R73.01 ELEVATED FASTING GLUCOSE: ICD-10-CM

## 2023-05-18 DIAGNOSIS — I10 ESSENTIAL HYPERTENSION: Primary | ICD-10-CM

## 2023-05-18 DIAGNOSIS — E66.9 OBESITY (BMI 30-39.9): ICD-10-CM

## 2023-05-18 DIAGNOSIS — R74.8 ELEVATED LIVER ENZYMES: ICD-10-CM

## 2023-05-18 NOTE — TELEPHONE ENCOUNTER
Labs were ordered to be done prior to his return visit in 6 months:   - Lipid Panel; Future  - Comprehensive Metabolic Panel; Future  - Hemoglobin A1C; Future  - CBC with Auto Differential; Future  - Vitamin D 25 Hydroxy; Future    Please mail the orders to the patient. Thank you.

## 2023-11-19 DIAGNOSIS — I10 ESSENTIAL HYPERTENSION: ICD-10-CM

## 2023-11-20 RX ORDER — AMLODIPINE AND BENAZEPRIL HYDROCHLORIDE 10; 40 MG/1; MG/1
CAPSULE ORAL
Qty: 90 CAPSULE | Refills: 0 | Status: SHIPPED | OUTPATIENT
Start: 2023-11-20

## 2023-11-20 NOTE — TELEPHONE ENCOUNTER
Aminah Cardozo called requesting a refill of the below medication which has been pended for you:     Requested Prescriptions     Pending Prescriptions Disp Refills    amLODIPine-benazepril (LOTREL) 10-40 MG per capsule 90 capsule 0     Sig: TAKE 1 CAPSULE DAILY       Last Appointment Date: 5/12/2023  Next Appointment Date: 11/29/2023    No Known Allergies

## 2023-11-27 ENCOUNTER — HOSPITAL ENCOUNTER (OUTPATIENT)
Age: 33
Discharge: HOME | End: 2023-11-27
Payer: COMMERCIAL

## 2023-11-27 DIAGNOSIS — E66.9 OBESITY (BMI 30-39.9): ICD-10-CM

## 2023-11-27 DIAGNOSIS — R74.8 ELEVATED LIVER ENZYMES: ICD-10-CM

## 2023-11-27 DIAGNOSIS — R73.01 ELEVATED FASTING GLUCOSE: ICD-10-CM

## 2023-11-27 LAB
25(OH)D3 SERPL-MCNC: 29.8 NG/ML
ALBUMIN SERPL-MCNC: 5.1 G/DL (ref 3.5–5.2)
ALBUMIN/GLOB SERPL: 2.7 {RATIO} (ref 1–2.5)
ALP SERPL-CCNC: 46 U/L (ref 40–129)
ALT SERPL-CCNC: 23 U/L (ref 5–41)
ANION GAP SERPL CALCULATED.3IONS-SCNC: 10 MMOL/L (ref 9–17)
AST SERPL-CCNC: 18 U/L
BASOPHILS # BLD: 0.05 K/UL (ref 0–0.2)
BASOPHILS NFR BLD: 1 % (ref 0–2)
BILIRUB SERPL-MCNC: 0.7 MG/DL (ref 0.3–1.2)
BUN SERPL-MCNC: 13 MG/DL (ref 6–20)
BUN/CREAT SERPL: 14 (ref 9–20)
CALCIUM SERPL-MCNC: 9.8 MG/DL (ref 8.6–10.4)
CHLORIDE SERPL-SCNC: 105 MMOL/L (ref 98–107)
CHOLEST SERPL-MCNC: 165 MG/DL
CHOLESTEROL/HDL RATIO: 3.5
CO2 SERPL-SCNC: 28 MMOL/L (ref 20–31)
CREAT SERPL-MCNC: 0.9 MG/DL (ref 0.7–1.2)
EOSINOPHIL # BLD: 0.14 K/UL (ref 0–0.44)
EOSINOPHILS RELATIVE PERCENT: 3 % (ref 1–4)
ERYTHROCYTE [DISTWIDTH] IN BLOOD BY AUTOMATED COUNT: 11.9 % (ref 11.8–14.4)
EST. AVERAGE GLUCOSE BLD GHB EST-MCNC: 82 MG/DL
GFR SERPL CREATININE-BSD FRML MDRD: >60 ML/MIN/1.73M2
GLUCOSE SERPL-MCNC: 101 MG/DL (ref 70–99)
HBA1C MFR BLD: 4.5 % (ref 4–6)
HCT VFR BLD AUTO: 50.1 % (ref 40.7–50.3)
HDLC SERPL-MCNC: 47 MG/DL
HGB BLD-MCNC: 17.8 G/DL (ref 13–17)
IMM GRANULOCYTES # BLD AUTO: <0.03 K/UL (ref 0–0.3)
IMM GRANULOCYTES NFR BLD: 0 %
LDLC SERPL CALC-MCNC: 95 MG/DL (ref 0–130)
LYMPHOCYTES NFR BLD: 1.75 K/UL (ref 1.1–3.7)
LYMPHOCYTES RELATIVE PERCENT: 37 % (ref 24–43)
MCH RBC QN AUTO: 31 PG (ref 25.2–33.5)
MCHC RBC AUTO-ENTMCNC: 35.5 G/DL (ref 25.2–33.5)
MCV RBC AUTO: 87.1 FL (ref 82.6–102.9)
MONOCYTES NFR BLD: 0.34 K/UL (ref 0.1–1.2)
MONOCYTES NFR BLD: 7 % (ref 3–12)
NEUTROPHILS NFR BLD: 52 % (ref 36–65)
NEUTS SEG NFR BLD: 2.48 K/UL (ref 1.5–8.1)
NRBC BLD-RTO: 0 PER 100 WBC
PLATELET # BLD AUTO: 281 K/UL (ref 138–453)
PMV BLD AUTO: 8.7 FL (ref 8.1–13.5)
POTASSIUM SERPL-SCNC: 5 MMOL/L (ref 3.7–5.3)
PROT SERPL-MCNC: 7 G/DL (ref 6.4–8.3)
RBC # BLD AUTO: 5.75 M/UL (ref 4.21–5.77)
SODIUM SERPL-SCNC: 143 MMOL/L (ref 135–144)
TRIGL SERPL-MCNC: 116 MG/DL
WBC OTHER # BLD: 4.8 K/UL (ref 3.5–11.3)

## 2023-11-27 PROCEDURE — 83036 HEMOGLOBIN GLYCOSYLATED A1C: CPT

## 2023-11-27 PROCEDURE — 80061 LIPID PANEL: CPT

## 2023-11-27 PROCEDURE — 82306 VITAMIN D 25 HYDROXY: CPT

## 2023-11-27 PROCEDURE — 85025 COMPLETE CBC W/AUTO DIFF WBC: CPT

## 2023-11-27 PROCEDURE — 80053 COMPREHEN METABOLIC PANEL: CPT

## 2023-11-27 PROCEDURE — 36415 COLL VENOUS BLD VENIPUNCTURE: CPT

## 2023-11-29 ENCOUNTER — OFFICE VISIT (OUTPATIENT)
Dept: FAMILY MEDICINE CLINIC | Age: 33
End: 2023-11-29
Payer: COMMERCIAL

## 2023-11-29 VITALS
WEIGHT: 182 LBS | HEIGHT: 69 IN | BODY MASS INDEX: 26.96 KG/M2 | OXYGEN SATURATION: 97 % | SYSTOLIC BLOOD PRESSURE: 122 MMHG | DIASTOLIC BLOOD PRESSURE: 80 MMHG | HEART RATE: 89 BPM

## 2023-11-29 DIAGNOSIS — I10 ESSENTIAL HYPERTENSION: ICD-10-CM

## 2023-11-29 DIAGNOSIS — E55.9 VITAMIN D DEFICIENCY: ICD-10-CM

## 2023-11-29 DIAGNOSIS — N50.812 TESTICULAR PAIN, LEFT: ICD-10-CM

## 2023-11-29 DIAGNOSIS — Z00.00 ANNUAL VISIT FOR GENERAL ADULT MEDICAL EXAMINATION WITHOUT ABNORMAL FINDINGS: Primary | ICD-10-CM

## 2023-11-29 PROCEDURE — 99213 OFFICE O/P EST LOW 20 MIN: CPT | Performed by: FAMILY MEDICINE

## 2023-11-29 PROCEDURE — 3079F DIAST BP 80-89 MM HG: CPT | Performed by: FAMILY MEDICINE

## 2023-11-29 PROCEDURE — 3074F SYST BP LT 130 MM HG: CPT | Performed by: FAMILY MEDICINE

## 2023-11-29 PROCEDURE — 99395 PREV VISIT EST AGE 18-39: CPT | Performed by: FAMILY MEDICINE

## 2023-11-29 RX ORDER — AMLODIPINE AND BENAZEPRIL HYDROCHLORIDE 10; 40 MG/1; MG/1
CAPSULE ORAL
Qty: 90 CAPSULE | Refills: 1 | Status: SHIPPED | OUTPATIENT
Start: 2023-11-29

## 2023-11-29 NOTE — PROGRESS NOTES
[facility] (2-)             [address] [place]                        Telephone [phone]             Fax [phone]       Jamie Smallwood  :  1990  Age:  26 y.o. MRN:  [id number]  Date of visit:  2023       Assessment and Plan:    1. Annual visit for general adult medical examination without abnormal findings  Health maintenance was reviewed with the patient. He was advised that there is an updated Covid vaccine available this . Covid vaccination was recommended. He has received an influenza vaccine this influenza season. All other health maintenance, including Tdap, is up to date at this time. There is no indication for pneumonia vaccination at this time. 2. Essential hypertension  His blood pressure is well-controlled today. (BP: 122/80)   He was advised to continue current medications. Amlodipine-Benazepril was refilled:   - amLODIPine-benazepril (LOTREL) 10-40 MG per capsule; TAKE 1 CAPSULE DAILY  Dispense: 90 capsule; Refill: 1    Labs were ordered to be done prior to his return visit in 6 months:   - Comprehensive Metabolic Panel; Future  - Lipid Panel; Future    3. Vitamin D deficiency  His 25-hydroxy Vitamin D level was mildly decreased (29.8 ng/mL) on  his recent lab work. He was advised to begin Vitamin D 2000 units daily. - Vitamin D 25 Hydroxy; Future was ordered to be done prior to his return visit in 6 months. Printed information regarding Learning About Vitamin D was provided to the patient with the after visit summary. 4. Testicular pain, left  There were no testicular masses palpated on exam.  The pain has resolved. He was advised to follow up if the pain recurs or if there are any other concerns. Follow up instructions were given to the patient:  Return in about 6 months (around 2024) for hypertension, vitamin D deficiency.            Subjective:    Rabia Ambrocio

## 2023-12-04 ENCOUNTER — PATIENT MESSAGE (OUTPATIENT)
Dept: FAMILY MEDICINE CLINIC | Age: 33
End: 2023-12-04

## 2023-12-04 DIAGNOSIS — N50.812 LEFT TESTICULAR PAIN: Primary | ICD-10-CM

## 2023-12-05 ENCOUNTER — HOSPITAL ENCOUNTER (OUTPATIENT)
Dept: ULTRASOUND IMAGING | Age: 33
Discharge: HOME | End: 2023-12-07
Attending: FAMILY MEDICINE
Payer: COMMERCIAL

## 2023-12-05 DIAGNOSIS — N50.812 LEFT TESTICULAR PAIN: ICD-10-CM

## 2023-12-05 PROCEDURE — 93976 VASCULAR STUDY: CPT

## 2023-12-05 NOTE — TELEPHONE ENCOUNTER
From: Abe Baldwin  To: Dr. Mendes Res: 12/4/2023 11:02 AM EST  Subject: Dull ache above left testicle. I had mentioned during my visit on the 29th that I had some very slight pain near my left testicle starting on November 20th that got worse a couple days later, but it was feeling a little better by the time of my appointment. I am still having the problem. It is not really painful, but it is more like a dull ache near my left testicle, more specifically just above my left testicle. It feels a little better when I am laying down on my back. So far, I am not having any major symptoms like nausea, fever, problems urinating or problems with bowel movements. I am just a little worried since it has been two weeks now since it started and it has felt pretty much the same for over a week. I am unsure if maybe I need to have an ultrasound of the area or just give it more time to see if it improves on its own or something else? Thanks.

## 2023-12-06 ENCOUNTER — TELEPHONE (OUTPATIENT)
Dept: FAMILY MEDICINE CLINIC | Age: 33
End: 2023-12-06

## 2023-12-06 DIAGNOSIS — N50.812 LEFT TESTICULAR PAIN: Primary | ICD-10-CM

## 2023-12-06 NOTE — TELEPHONE ENCOUNTER
----- Message from Leslie Bradshaw MD sent at 12/6/2023 12:14 PM EST -----  Please advise Armando Velez that the ultrasound was normal.  I recommend follow up with urology if his symptoms continue.

## 2023-12-06 NOTE — TELEPHONE ENCOUNTER
Patient notified and verbalized understanding. Patient agreeable to urology referral. Call transferred to schedule.

## 2023-12-13 ENCOUNTER — OFFICE VISIT (OUTPATIENT)
Dept: UROLOGY | Age: 33
End: 2023-12-13
Payer: COMMERCIAL

## 2023-12-13 VITALS
HEART RATE: 101 BPM | HEIGHT: 69 IN | SYSTOLIC BLOOD PRESSURE: 122 MMHG | WEIGHT: 182 LBS | BODY MASS INDEX: 26.96 KG/M2 | DIASTOLIC BLOOD PRESSURE: 64 MMHG

## 2023-12-13 DIAGNOSIS — N45.3 EPIDIDYMOORCHITIS: Primary | ICD-10-CM

## 2023-12-13 DIAGNOSIS — N50.811 PAIN IN BOTH TESTICLES: ICD-10-CM

## 2023-12-13 DIAGNOSIS — N50.812 PAIN IN BOTH TESTICLES: ICD-10-CM

## 2023-12-13 DIAGNOSIS — N43.42 SPERMATOCELE OF EPIDIDYMIS, MULTIPLE: ICD-10-CM

## 2023-12-13 PROCEDURE — 99204 OFFICE O/P NEW MOD 45 MIN: CPT | Performed by: UROLOGY

## 2023-12-13 PROCEDURE — 3074F SYST BP LT 130 MM HG: CPT | Performed by: UROLOGY

## 2023-12-13 PROCEDURE — 3078F DIAST BP <80 MM HG: CPT | Performed by: UROLOGY

## 2023-12-13 RX ORDER — SULFAMETHOXAZOLE AND TRIMETHOPRIM 800; 160 MG/1; MG/1
1 TABLET ORAL 2 TIMES DAILY
Qty: 56 TABLET | Refills: 0 | Status: SHIPPED | OUTPATIENT
Start: 2023-12-13 | End: 2024-01-10

## 2023-12-13 NOTE — PROGRESS NOTES
Lisa Diaz MD   Urology Clinic office Visit      Patient:  Dot Wilcox  YOB: 1990  Date: 12/13/2023    HISTORY OF PRESENT ILLNESS:   The patient is a 26 y.o. male who presents today for evaluation of the following problem(s):      1. Epididymoorchitis    2. Pain in both testicles    3. Spermatocele of epididymis, multiple           Overall the problem(s) : are worsening. Associated Symptoms: No dysuria, gross hematuria. Pain Severity:      Summary of old records: N/A  (Patient's old records, notes and chart reviewed and summarized above.)      Onset 11/2023  Severity is described as had left testicular pain  Intermittent  Dull/sharp  Had follow up with PCP  JAYDA ordered  Was not given abx  2 days ago mild right sided pain  Simple right epididymal cyst 18 mm   Simple left epididymal cyst 21 mm       I independently reviewed and verified the images and reports from:    [id number]    Result Date: 12/5/2023  EXAMINATION: ULTRASOUND OF THE SCROTUM/TESTICLES WITH COLOR DOPPLER FLOW EVALUATION 12/5/2023 TECHNIQUE: Duplex ultrasound using B-mode/gray scaled imaging, Doppler spectral analysis and color flow Doppler was obtained of the testicles. COMPARISON: None. HISTORY: ORDERING SYSTEM PROVIDED HISTORY: Left testicular pain TECHNOLOGIST PROVIDED HISTORY: pain left testicle Reason for Exam: left testicular pain FINDINGS: Measurements: Right Testicle: 54 x 30 x 23 mm Left Testicle: 50 x 32 x 18 mm Right: Grey Scale: The right testicle demonstrates normal homogeneous echotexture without focal lesion. No evidence of testicular microlithiasis. Doppler Evaluation: There is normal arterial and venous Doppler flow within the testicle. Scrotal Sac: No evidence of hydrocele. Epididymis: Simple right epididymal cyst 18 mm Left: Grey Scale: The left testicle demonstrates normal homogeneous echotexture without focal lesion. No evidence of testicular microlithiasis.  Doppler Evaluation:

## 2024-01-24 ENCOUNTER — OFFICE VISIT (OUTPATIENT)
Dept: UROLOGY | Age: 34
End: 2024-01-24
Payer: COMMERCIAL

## 2024-01-24 VITALS
HEIGHT: 69 IN | SYSTOLIC BLOOD PRESSURE: 138 MMHG | DIASTOLIC BLOOD PRESSURE: 74 MMHG | HEART RATE: 100 BPM | OXYGEN SATURATION: 98 % | BODY MASS INDEX: 27.37 KG/M2 | WEIGHT: 184.8 LBS

## 2024-01-24 DIAGNOSIS — N45.3 EPIDIDYMOORCHITIS: Primary | ICD-10-CM

## 2024-01-24 DIAGNOSIS — N50.812 PAIN IN BOTH TESTICLES: ICD-10-CM

## 2024-01-24 DIAGNOSIS — N50.811 PAIN IN BOTH TESTICLES: ICD-10-CM

## 2024-01-24 DIAGNOSIS — N43.42 SPERMATOCELE OF EPIDIDYMIS, MULTIPLE: ICD-10-CM

## 2024-01-24 DIAGNOSIS — R35.0 URINARY FREQUENCY: ICD-10-CM

## 2024-01-24 PROCEDURE — 99214 OFFICE O/P EST MOD 30 MIN: CPT | Performed by: UROLOGY

## 2024-01-24 PROCEDURE — 3075F SYST BP GE 130 - 139MM HG: CPT | Performed by: UROLOGY

## 2024-01-24 PROCEDURE — 3078F DIAST BP <80 MM HG: CPT | Performed by: UROLOGY

## 2024-01-24 RX ORDER — ACETAMINOPHEN 160 MG
3000 TABLET,DISINTEGRATING ORAL DAILY
COMMUNITY

## 2024-01-24 NOTE — PROGRESS NOTES
negative  Genitourinary: negative except for what is in HPI  Skin: negative   Neurological: negative  Hematological/Lymphatic: negative  Psychological: negative    Physical Exam:    This a 33 y.o. male   Vitals:    01/24/24 1011   BP: 138/74   Pulse: 100   SpO2: 98%     Constitutional: Patient in no acute distress;   Neuro: alert and oriented to person place and time.    Psych: Mood and affect normal.        Assessment and Plan      1. Epididymoorchitis    2. Pain in both testicles    3. Spermatocele of epididymis, multiple    4. Urinary frequency           Post void dribbling- discussed urethral milking    Exam confirms spermatoceles    Trial of bactrim x 1 month helped with the pain    discussed possible spermatocele removal- he would like to hold off for now    VV in 6 months  Give list of bladder irritants      Prescriptions Ordered:  No orders of the defined types were placed in this encounter.     Orders Placed:  No orders of the defined types were placed in this encounter.           TARUN MEADE MD    No follow-ups on file.      Tarun Meade MD  [facility]

## 2024-02-20 DIAGNOSIS — I10 ESSENTIAL HYPERTENSION: ICD-10-CM

## 2024-02-20 RX ORDER — AMLODIPINE AND BENAZEPRIL HYDROCHLORIDE 10; 40 MG/1; MG/1
CAPSULE ORAL
Qty: 30 CAPSULE | Refills: 0 | Status: SHIPPED | OUTPATIENT
Start: 2024-02-20

## 2024-03-26 DIAGNOSIS — I10 ESSENTIAL HYPERTENSION: ICD-10-CM

## 2024-03-26 RX ORDER — AMLODIPINE AND BENAZEPRIL HYDROCHLORIDE 10; 40 MG/1; MG/1
CAPSULE ORAL
Qty: 30 CAPSULE | Refills: 0 | Status: SHIPPED | OUTPATIENT
Start: 2024-03-26

## 2024-03-26 NOTE — TELEPHONE ENCOUNTER
Stepan called requesting a refill of the below medication which has been pended for you:     Requested Prescriptions     Pending Prescriptions Disp Refills    amLODIPine-benazepril (LOTREL) 10-40 MG per capsule 30 capsule 0     Sig: TAKE 1 CAPSULE DAILY       Last Appointment Date: 11/29/2023  Next Appointment Date: 4/17/2024    No Known Allergies

## 2024-04-15 ENCOUNTER — TELEPHONE (OUTPATIENT)
Dept: FAMILY MEDICINE CLINIC | Age: 34
End: 2024-04-15

## 2024-04-15 NOTE — TELEPHONE ENCOUNTER
PT stopped by the window to state his lab work dates will need to be changed before his appointment on Wednesday. He would like a phone call to let him know labs dates  were changed so he can get them done.

## 2024-04-16 ENCOUNTER — HOSPITAL ENCOUNTER (OUTPATIENT)
Age: 34
Discharge: HOME | End: 2024-04-16
Payer: COMMERCIAL

## 2024-04-16 DIAGNOSIS — I10 ESSENTIAL HYPERTENSION: ICD-10-CM

## 2024-04-16 DIAGNOSIS — E55.9 VITAMIN D DEFICIENCY: ICD-10-CM

## 2024-04-16 LAB
25(OH)D3 SERPL-MCNC: 40.7 NG/ML (ref 30–100)
ALBUMIN SERPL-MCNC: 5.2 G/DL (ref 3.5–5.2)
ALBUMIN/GLOB SERPL: 2.3 {RATIO} (ref 1–2.5)
ALP SERPL-CCNC: 46 U/L (ref 40–129)
ALT SERPL-CCNC: 31 U/L (ref 5–41)
ANION GAP SERPL CALCULATED.3IONS-SCNC: 9 MMOL/L (ref 9–17)
AST SERPL-CCNC: 26 U/L
BILIRUB SERPL-MCNC: 1.1 MG/DL (ref 0.3–1.2)
BUN SERPL-MCNC: 14 MG/DL (ref 6–20)
BUN/CREAT SERPL: 14 (ref 9–20)
CALCIUM SERPL-MCNC: 10.1 MG/DL (ref 8.6–10.4)
CHLORIDE SERPL-SCNC: 100 MMOL/L (ref 98–107)
CHOLEST SERPL-MCNC: 162 MG/DL (ref 0–199)
CHOLESTEROL/HDL RATIO: 3
CO2 SERPL-SCNC: 29 MMOL/L (ref 20–31)
CREAT SERPL-MCNC: 1 MG/DL (ref 0.7–1.2)
GFR SERPL CREATININE-BSD FRML MDRD: >90 ML/MIN/1.73M2
GLUCOSE SERPL-MCNC: 100 MG/DL (ref 70–99)
HDLC SERPL-MCNC: 51 MG/DL
LDLC SERPL CALC-MCNC: 85 MG/DL (ref 0–100)
POTASSIUM SERPL-SCNC: 5.2 MMOL/L (ref 3.7–5.3)
PROT SERPL-MCNC: 7.5 G/DL (ref 6.4–8.3)
SODIUM SERPL-SCNC: 138 MMOL/L (ref 135–144)
TRIGL SERPL-MCNC: 134 MG/DL
VLDLC SERPL CALC-MCNC: 27 MG/DL

## 2024-04-16 PROCEDURE — 82306 VITAMIN D 25 HYDROXY: CPT

## 2024-04-16 PROCEDURE — 80061 LIPID PANEL: CPT

## 2024-04-16 PROCEDURE — 80053 COMPREHEN METABOLIC PANEL: CPT

## 2024-04-16 PROCEDURE — 36415 COLL VENOUS BLD VENIPUNCTURE: CPT

## 2024-04-17 ENCOUNTER — OFFICE VISIT (OUTPATIENT)
Dept: FAMILY MEDICINE CLINIC | Age: 34
End: 2024-04-17
Payer: COMMERCIAL

## 2024-04-17 ENCOUNTER — HOSPITAL ENCOUNTER (OUTPATIENT)
Age: 34
Discharge: HOME | End: 2024-04-17
Payer: COMMERCIAL

## 2024-04-17 VITALS
BODY MASS INDEX: 28.44 KG/M2 | HEIGHT: 69 IN | DIASTOLIC BLOOD PRESSURE: 72 MMHG | TEMPERATURE: 98.8 F | SYSTOLIC BLOOD PRESSURE: 126 MMHG | WEIGHT: 192 LBS | OXYGEN SATURATION: 97 % | HEART RATE: 100 BPM

## 2024-04-17 DIAGNOSIS — R73.01 ELEVATED FASTING GLUCOSE: ICD-10-CM

## 2024-04-17 DIAGNOSIS — R35.0 URINARY FREQUENCY: ICD-10-CM

## 2024-04-17 DIAGNOSIS — R82.90 URINE ABNORMALITY: Primary | ICD-10-CM

## 2024-04-17 DIAGNOSIS — I10 ESSENTIAL HYPERTENSION: Primary | ICD-10-CM

## 2024-04-17 LAB
BILIRUB UR QL STRIP: NEGATIVE
CLARITY UR: CLEAR
COLOR UR: YELLOW
COMMENT: ABNORMAL
GLUCOSE UR STRIP-MCNC: NEGATIVE MG/DL
HGB UR QL STRIP.AUTO: NEGATIVE
KETONES UR STRIP-MCNC: NEGATIVE MG/DL
LEUKOCYTE ESTERASE UR QL STRIP: NEGATIVE
NITRITE UR QL STRIP: NEGATIVE
PH UR STRIP: 8 [PH] (ref 5–6)
PROT UR STRIP-MCNC: NEGATIVE MG/DL
SP GR UR STRIP: 1.02 (ref 1.01–1.02)
UROBILINOGEN UR STRIP-ACNC: NORMAL EU/DL (ref 0–1)

## 2024-04-17 PROCEDURE — 3074F SYST BP LT 130 MM HG: CPT | Performed by: FAMILY MEDICINE

## 2024-04-17 PROCEDURE — 81003 URINALYSIS AUTO W/O SCOPE: CPT

## 2024-04-17 PROCEDURE — 3078F DIAST BP <80 MM HG: CPT | Performed by: FAMILY MEDICINE

## 2024-04-17 PROCEDURE — 99214 OFFICE O/P EST MOD 30 MIN: CPT | Performed by: FAMILY MEDICINE

## 2024-04-17 RX ORDER — AMLODIPINE AND BENAZEPRIL HYDROCHLORIDE 10; 40 MG/1; MG/1
CAPSULE ORAL
Qty: 90 CAPSULE | Refills: 1 | Status: SHIPPED | OUTPATIENT
Start: 2024-04-17

## 2024-04-17 ASSESSMENT — PATIENT HEALTH QUESTIONNAIRE - PHQ9
SUM OF ALL RESPONSES TO PHQ9 QUESTIONS 1 & 2: 0
SUM OF ALL RESPONSES TO PHQ QUESTIONS 1-9: 0
SUM OF ALL RESPONSES TO PHQ QUESTIONS 1-9: 0
2. FEELING DOWN, DEPRESSED OR HOPELESS: NOT AT ALL
SUM OF ALL RESPONSES TO PHQ QUESTIONS 1-9: 0
SUM OF ALL RESPONSES TO PHQ QUESTIONS 1-9: 0
1. LITTLE INTEREST OR PLEASURE IN DOING THINGS: NOT AT ALL

## 2024-04-17 NOTE — PROGRESS NOTES
index is 28.35 kg/m².      Well-nourished, well-developed male, healthy-appearing, alert, cooperative, and in no acute distress.  Neck supple. No adenopathy. Thyroid symmetric, normal size.  Chest:  Normal expansion.  Clear to auscultation.  No rales, rhonchi, or wheezing.  Heart sounds are normal.  Regular rate and rhythm without murmur, gallop or rub.  Back has no costovertebral angle tenderness.  Abdomen is soft, non-tender, non-distended.  There are no masses palpated.   Lower extremities have no edema.    Results of labs done 4/16/2024 were reviewed with the patient:   Hospital Outpatient Visit on 04/16/2024   Component Date Value Ref Range Status    Vit D, 25-Hydroxy 04/16/2024 40.7  30.0 - 100.0 ng/mL Final    Comment:    Reference Range:  Vitamin D status         Range   Deficiency              <20 ng/mL   Mild Deficiency       20-30 ng/mL   Sufficiency           ng/mL   Toxicity               >100 ng/mL      Cholesterol 04/16/2024 162  0 - 199 mg/dL Final    Comment:    Cholesterol Guidelines:      <200  Desirable   200-240  Borderline      >240  Undesirable         HDL 04/16/2024 51  >40 mg/dL Final    Comment:    HDL Guidelines:    <40     Undesirable   40-59    Borderline    >59     Desirable         LDL Cholesterol 04/16/2024 85  0 - 100 mg/dL Final    Comment:    LDL Guidelines:     <100    Desirable   100-129   Near to/above Desirable   130-159   Borderline      >159   Undesirable     Direct (measured) LDL and calculated LDL are not interchangeable tests.      Chol/HDL Ratio 04/16/2024 3.0   Final    Triglycerides 04/16/2024 134  <150 mg/dL Final    Comment:    Triglyceride Guidelines:     <150   Desirable   150-199  Borderline   200-499  High     >499   Very high   Based on AHA Guidelines for fasting triglyceride, October 2012.         VLDL 04/16/2024 27  mg/dL Final    Sodium 04/16/2024 138  135 - 144 mmol/L Final    Potassium 04/16/2024 5.2  3.7 - 5.3 mmol/L Final    Chloride 04/16/2024 100

## 2024-04-24 ENCOUNTER — HOSPITAL ENCOUNTER (OUTPATIENT)
Age: 34
Discharge: HOME | End: 2024-04-24
Payer: COMMERCIAL

## 2024-04-24 DIAGNOSIS — R82.90 URINE ABNORMALITY: ICD-10-CM

## 2024-04-24 LAB
ANION GAP SERPL CALCULATED.3IONS-SCNC: 10 MMOL/L (ref 9–17)
BACTERIA URNS QL MICRO: ABNORMAL
BILIRUB UR QL STRIP: ABNORMAL
BUN SERPL-MCNC: 13 MG/DL (ref 6–20)
BUN/CREAT SERPL: 12 (ref 9–20)
CALCIUM SERPL-MCNC: 9.9 MG/DL (ref 8.6–10.4)
CHARACTER UR: ABNORMAL
CHLORIDE SERPL-SCNC: 99 MMOL/L (ref 98–107)
CLARITY UR: CLEAR
CO2 SERPL-SCNC: 29 MMOL/L (ref 20–31)
COLOR UR: YELLOW
CREAT SERPL-MCNC: 1.1 MG/DL (ref 0.7–1.2)
EPI CELLS #/AREA URNS HPF: ABNORMAL /HPF (ref 0–5)
GFR SERPL CREATININE-BSD FRML MDRD: 90 ML/MIN/1.73M2
GLUCOSE SERPL-MCNC: 99 MG/DL (ref 70–99)
GLUCOSE UR STRIP-MCNC: NEGATIVE MG/DL
HGB UR QL STRIP.AUTO: NEGATIVE
KETONES UR STRIP-MCNC: NEGATIVE MG/DL
LEUKOCYTE ESTERASE UR QL STRIP: NEGATIVE
NITRITE UR QL STRIP: NEGATIVE
PH UR STRIP: 6.5 [PH] (ref 5–6)
POTASSIUM SERPL-SCNC: 4.2 MMOL/L (ref 3.7–5.3)
PROT UR STRIP-MCNC: ABNORMAL MG/DL
RBC #/AREA URNS HPF: ABNORMAL /HPF (ref 0–4)
SODIUM SERPL-SCNC: 138 MMOL/L (ref 135–144)
SP GR UR STRIP: 1.02 (ref 1.01–1.02)
UROBILINOGEN UR STRIP-ACNC: NORMAL EU/DL (ref 0–1)
WBC #/AREA URNS HPF: ABNORMAL /HPF (ref 0–4)

## 2024-04-24 PROCEDURE — 80048 BASIC METABOLIC PNL TOTAL CA: CPT

## 2024-04-24 PROCEDURE — 36415 COLL VENOUS BLD VENIPUNCTURE: CPT

## 2024-04-24 PROCEDURE — 81001 URINALYSIS AUTO W/SCOPE: CPT

## 2024-04-25 DIAGNOSIS — R80.9 PROTEINURIA, UNSPECIFIED TYPE: Primary | ICD-10-CM

## 2024-05-02 ENCOUNTER — TELEPHONE (OUTPATIENT)
Dept: FAMILY MEDICINE CLINIC | Age: 34
End: 2024-05-02

## 2024-05-02 ENCOUNTER — HOSPITAL ENCOUNTER (OUTPATIENT)
Age: 34
Discharge: HOME | End: 2024-05-02
Payer: COMMERCIAL

## 2024-05-02 DIAGNOSIS — R35.0 URINARY FREQUENCY: Primary | ICD-10-CM

## 2024-05-02 DIAGNOSIS — R80.9 PROTEINURIA, UNSPECIFIED TYPE: ICD-10-CM

## 2024-05-02 LAB
BILIRUB UR QL STRIP: NEGATIVE
CLARITY UR: CLEAR
COLOR UR: YELLOW
COMMENT: ABNORMAL
GLUCOSE UR STRIP-MCNC: NEGATIVE MG/DL
HGB UR QL STRIP.AUTO: NEGATIVE
KETONES UR STRIP-MCNC: NEGATIVE MG/DL
LEUKOCYTE ESTERASE UR QL STRIP: NEGATIVE
NITRITE UR QL STRIP: NEGATIVE
PH UR STRIP: 6 [PH] (ref 5–6)
PROT UR STRIP-MCNC: NEGATIVE MG/DL
SP GR UR STRIP: 1 (ref 1.01–1.02)
UROBILINOGEN UR STRIP-ACNC: NORMAL EU/DL (ref 0–1)

## 2024-05-02 PROCEDURE — 81003 URINALYSIS AUTO W/O SCOPE: CPT

## 2024-09-14 DIAGNOSIS — I10 ESSENTIAL HYPERTENSION: ICD-10-CM

## 2024-09-17 RX ORDER — AMLODIPINE AND BENAZEPRIL HYDROCHLORIDE 10; 40 MG/1; MG/1
CAPSULE ORAL
Qty: 90 CAPSULE | Refills: 0 | Status: SHIPPED | OUTPATIENT
Start: 2024-09-17

## 2024-10-14 ENCOUNTER — HOSPITAL ENCOUNTER (OUTPATIENT)
Age: 34
Discharge: HOME | End: 2024-10-14
Payer: COMMERCIAL

## 2024-10-14 DIAGNOSIS — R80.9 PROTEINURIA, UNSPECIFIED TYPE: ICD-10-CM

## 2024-10-14 LAB
BILIRUB UR QL STRIP: NEGATIVE
CLARITY UR: CLEAR
COLOR UR: YELLOW
COMMENT: ABNORMAL
GLUCOSE UR STRIP-MCNC: NEGATIVE MG/DL
HGB UR QL STRIP.AUTO: NEGATIVE
KETONES UR STRIP-MCNC: NEGATIVE MG/DL
LEUKOCYTE ESTERASE UR QL STRIP: NEGATIVE
NITRITE UR QL STRIP: NEGATIVE
PH UR STRIP: 6 [PH] (ref 5–6)
PROT UR STRIP-MCNC: NEGATIVE MG/DL
SP GR UR STRIP: 1.01 (ref 1.01–1.02)
UROBILINOGEN UR STRIP-ACNC: NORMAL EU/DL (ref 0–1)

## 2024-10-14 PROCEDURE — 81003 URINALYSIS AUTO W/O SCOPE: CPT

## 2024-10-15 ENCOUNTER — HOSPITAL ENCOUNTER (OUTPATIENT)
Age: 34
Discharge: HOME | End: 2024-10-15
Payer: COMMERCIAL

## 2024-10-15 DIAGNOSIS — R73.01 ELEVATED FASTING GLUCOSE: ICD-10-CM

## 2024-10-15 LAB
ALBUMIN SERPL-MCNC: 5.2 G/DL (ref 3.5–5.2)
ALBUMIN/GLOB SERPL: 2.3 {RATIO} (ref 1–2.5)
ALP SERPL-CCNC: 44 U/L (ref 40–129)
ALT SERPL-CCNC: 38 U/L (ref 5–41)
ANION GAP SERPL CALCULATED.3IONS-SCNC: 10 MMOL/L (ref 9–17)
AST SERPL-CCNC: 30 U/L
BILIRUB SERPL-MCNC: 1.2 MG/DL (ref 0.3–1.2)
BUN SERPL-MCNC: 16 MG/DL (ref 6–20)
BUN/CREAT SERPL: 16 (ref 9–20)
CALCIUM SERPL-MCNC: 10.1 MG/DL (ref 8.6–10.4)
CHLORIDE SERPL-SCNC: 101 MMOL/L (ref 98–107)
CHOLEST SERPL-MCNC: 156 MG/DL (ref 0–199)
CHOLESTEROL/HDL RATIO: 3
CO2 SERPL-SCNC: 27 MMOL/L (ref 20–31)
CREAT SERPL-MCNC: 1 MG/DL (ref 0.7–1.2)
EST. AVERAGE GLUCOSE BLD GHB EST-MCNC: 77 MG/DL
GFR, ESTIMATED: >90 ML/MIN/1.73M2
GLUCOSE SERPL-MCNC: 99 MG/DL (ref 70–99)
HBA1C MFR BLD: 4.3 % (ref 4–6)
HDLC SERPL-MCNC: 48 MG/DL
LDLC SERPL CALC-MCNC: 88 MG/DL (ref 0–100)
POTASSIUM SERPL-SCNC: 4.6 MMOL/L (ref 3.7–5.3)
PROT SERPL-MCNC: 7.5 G/DL (ref 6.4–8.3)
SODIUM SERPL-SCNC: 138 MMOL/L (ref 135–144)
TRIGL SERPL-MCNC: 101 MG/DL
VLDLC SERPL CALC-MCNC: 20 MG/DL

## 2024-10-15 PROCEDURE — 80053 COMPREHEN METABOLIC PANEL: CPT

## 2024-10-15 PROCEDURE — 36415 COLL VENOUS BLD VENIPUNCTURE: CPT

## 2024-10-15 PROCEDURE — 80061 LIPID PANEL: CPT

## 2024-10-15 PROCEDURE — 83036 HEMOGLOBIN GLYCOSYLATED A1C: CPT

## 2024-10-16 SDOH — ECONOMIC STABILITY: INCOME INSECURITY: HOW HARD IS IT FOR YOU TO PAY FOR THE VERY BASICS LIKE FOOD, HOUSING, MEDICAL CARE, AND HEATING?: NOT VERY HARD

## 2024-10-16 SDOH — ECONOMIC STABILITY: FOOD INSECURITY: WITHIN THE PAST 12 MONTHS, THE FOOD YOU BOUGHT JUST DIDN'T LAST AND YOU DIDN'T HAVE MONEY TO GET MORE.: NEVER TRUE

## 2024-10-16 SDOH — ECONOMIC STABILITY: TRANSPORTATION INSECURITY
IN THE PAST 12 MONTHS, HAS LACK OF TRANSPORTATION KEPT YOU FROM MEETINGS, WORK, OR FROM GETTING THINGS NEEDED FOR DAILY LIVING?: NO

## 2024-10-16 SDOH — ECONOMIC STABILITY: FOOD INSECURITY: WITHIN THE PAST 12 MONTHS, YOU WORRIED THAT YOUR FOOD WOULD RUN OUT BEFORE YOU GOT MONEY TO BUY MORE.: NEVER TRUE

## 2024-10-17 ENCOUNTER — OFFICE VISIT (OUTPATIENT)
Dept: FAMILY MEDICINE CLINIC | Age: 34
End: 2024-10-17
Payer: COMMERCIAL

## 2024-10-17 VITALS
HEIGHT: 69 IN | TEMPERATURE: 98.3 F | OXYGEN SATURATION: 97 % | SYSTOLIC BLOOD PRESSURE: 132 MMHG | BODY MASS INDEX: 29.77 KG/M2 | WEIGHT: 201 LBS | HEART RATE: 100 BPM | DIASTOLIC BLOOD PRESSURE: 80 MMHG

## 2024-10-17 DIAGNOSIS — E55.9 VITAMIN D DEFICIENCY: ICD-10-CM

## 2024-10-17 DIAGNOSIS — R73.01 ELEVATED FASTING GLUCOSE: ICD-10-CM

## 2024-10-17 DIAGNOSIS — I10 ESSENTIAL HYPERTENSION: Primary | ICD-10-CM

## 2024-10-17 PROCEDURE — 99214 OFFICE O/P EST MOD 30 MIN: CPT | Performed by: FAMILY MEDICINE

## 2024-10-17 PROCEDURE — 3079F DIAST BP 80-89 MM HG: CPT | Performed by: FAMILY MEDICINE

## 2024-10-17 PROCEDURE — 3075F SYST BP GE 130 - 139MM HG: CPT | Performed by: FAMILY MEDICINE

## 2024-10-17 RX ORDER — AMLODIPINE AND BENAZEPRIL HYDROCHLORIDE 10; 40 MG/1; MG/1
CAPSULE ORAL
Qty: 90 CAPSULE | Refills: 0 | Status: SHIPPED | OUTPATIENT
Start: 2024-10-17

## 2024-10-17 ASSESSMENT — PATIENT HEALTH QUESTIONNAIRE - PHQ9
SUM OF ALL RESPONSES TO PHQ QUESTIONS 1-9: 0
1. LITTLE INTEREST OR PLEASURE IN DOING THINGS: NOT AT ALL
SUM OF ALL RESPONSES TO PHQ9 QUESTIONS 1 & 2: 0
2. FEELING DOWN, DEPRESSED OR HOPELESS: NOT AT ALL
SUM OF ALL RESPONSES TO PHQ QUESTIONS 1-9: 0

## 2024-10-17 NOTE — PROGRESS NOTES
(MULTIVITAMIN PO) Take 1 tablet by mouth daily.         He has No Known Allergies.    He  reports that he has never smoked. He has never used smokeless tobacco.      Objective:    Vitals:    10/17/24 0942   BP: 132/80   Site: Right Upper Arm   Position: Sitting   Cuff Size: Large Adult   Pulse: 100   Temp: 98.3 °F (36.8 °C)   TempSrc: Tympanic   SpO2: 97%   Weight: 91.2 kg (201 lb)   Height: 1.753 m (5' 9\")     Body mass index is 29.68 kg/m².      Well-nourished, well-developed male, healthy-appearing, alert, cooperative, and in no acute distress.  Neck supple. No adenopathy. Thyroid symmetric, normal size.  Chest:  Normal expansion.  Clear to auscultation.  No rales, rhonchi, or wheezing.  Heart sounds are normal.  Regular rate and rhythm without murmur, gallop or rub.  Lower extremities have no edema.    Results of labs done 10/15/2024 were reviewed with the patient:   Hospital Outpatient Visit on 10/15/2024   Component Date Value Ref Range Status    Sodium 10/15/2024 138  135 - 144 mmol/L Final    Potassium 10/15/2024 4.6  3.7 - 5.3 mmol/L Final    Chloride 10/15/2024 101  98 - 107 mmol/L Final    CO2 10/15/2024 27  20 - 31 mmol/L Final    Anion Gap 10/15/2024 10  9 - 17 mmol/L Final    Glucose 10/15/2024 99  70 - 99 mg/dL Final    BUN 10/15/2024 16  6 - 20 mg/dL Final    Creatinine 10/15/2024 1.0  0.7 - 1.2 mg/dL Final    Est, Glom Filt Rate 10/15/2024 >90  >60 mL/min/1.73m2 Final    Comment:       These results are not intended for use in patients <18 years of age.        eGFR results are calculated without a race factor using the 2021 CKD-EPI equation.  Careful clinical correlation is recommended, particularly when comparing to results   calculated using previous equations.  The CKD-EPI equation is less accurate in patients with extremes of muscle mass, extra-renal   metabolism of creatine, excessive creatine ingestion, or following therapy that affects   renal tubular secretion.      BUN/Creatinine Ratio

## 2025-03-03 DIAGNOSIS — I10 ESSENTIAL HYPERTENSION: ICD-10-CM

## 2025-03-04 RX ORDER — AMLODIPINE AND BENAZEPRIL HYDROCHLORIDE 10; 40 MG/1; MG/1
CAPSULE ORAL
Qty: 90 CAPSULE | Refills: 0 | Status: SHIPPED | OUTPATIENT
Start: 2025-03-04

## 2025-03-04 NOTE — TELEPHONE ENCOUNTER
Stepan called requesting a refill of the below medication which has been pended for you:     Requested Prescriptions     Pending Prescriptions Disp Refills    amLODIPine-benazepril (LOTREL) 10-40 MG per capsule [Pharmacy Med Name: AMLODIPINE BESY-BENAZEPR 10-40 MG CAPS] 90 capsule 0     Sig: TAKE ONE CAPSULE BY MOUTH EVERY DAY       Last Appointment Date: 10/17/2024  Next Appointment Date: 4/17/2025    No Known Allergies

## 2025-04-24 ENCOUNTER — HOSPITAL ENCOUNTER (OUTPATIENT)
Age: 35
Discharge: HOME | End: 2025-04-24
Payer: COMMERCIAL

## 2025-04-24 DIAGNOSIS — E55.9 VITAMIN D DEFICIENCY: ICD-10-CM

## 2025-04-24 DIAGNOSIS — R73.01 ELEVATED FASTING GLUCOSE: ICD-10-CM

## 2025-04-24 LAB
25(OH)D3 SERPL-MCNC: 57.8 NG/ML (ref 30–100)
ALBUMIN SERPL-MCNC: 5 G/DL (ref 3.5–5.2)
ALBUMIN/GLOB SERPL: 2.3 {RATIO} (ref 1–2.5)
ALP SERPL-CCNC: 53 U/L (ref 40–129)
ALT SERPL-CCNC: 28 U/L (ref 10–50)
ANION GAP SERPL CALCULATED.3IONS-SCNC: 13 MMOL/L (ref 9–16)
AST SERPL-CCNC: 22 U/L (ref 10–50)
BILIRUB SERPL-MCNC: 0.8 MG/DL (ref 0–1.2)
BUN SERPL-MCNC: 16 MG/DL (ref 6–20)
BUN/CREAT SERPL: 15 (ref 9–20)
CALCIUM SERPL-MCNC: 10.5 MG/DL (ref 8.6–10.4)
CHLORIDE SERPL-SCNC: 100 MMOL/L (ref 98–107)
CHOLEST SERPL-MCNC: 157 MG/DL (ref 0–199)
CHOLESTEROL/HDL RATIO: 3.1
CO2 SERPL-SCNC: 26 MMOL/L (ref 20–31)
CREAT SERPL-MCNC: 1.1 MG/DL (ref 0.7–1.2)
EST. AVERAGE GLUCOSE BLD GHB EST-MCNC: 80 MG/DL
GFR, ESTIMATED: 89 ML/MIN/1.73M2
GLUCOSE SERPL-MCNC: 96 MG/DL (ref 74–99)
HBA1C MFR BLD: 4.4 % (ref 4–6)
HDLC SERPL-MCNC: 50 MG/DL
LDLC SERPL CALC-MCNC: 93 MG/DL (ref 0–100)
POTASSIUM SERPL-SCNC: 5.4 MMOL/L (ref 3.7–5.3)
PROT SERPL-MCNC: 7.2 G/DL (ref 6.6–8.7)
SODIUM SERPL-SCNC: 139 MMOL/L (ref 136–145)
TRIGL SERPL-MCNC: 72 MG/DL
VLDLC SERPL CALC-MCNC: 14 MG/DL (ref 1–30)

## 2025-04-24 PROCEDURE — 83036 HEMOGLOBIN GLYCOSYLATED A1C: CPT

## 2025-04-24 PROCEDURE — 80061 LIPID PANEL: CPT

## 2025-04-24 PROCEDURE — 82306 VITAMIN D 25 HYDROXY: CPT

## 2025-04-24 PROCEDURE — 80053 COMPREHEN METABOLIC PANEL: CPT

## 2025-04-24 PROCEDURE — 36415 COLL VENOUS BLD VENIPUNCTURE: CPT

## 2025-04-26 ENCOUNTER — RESULTS FOLLOW-UP (OUTPATIENT)
Dept: PRIMARY CARE CLINIC | Age: 35
End: 2025-04-26

## 2025-04-26 DIAGNOSIS — E83.52 HYPERCALCEMIA: ICD-10-CM

## 2025-04-26 DIAGNOSIS — E87.5 HYPERKALEMIA: Primary | ICD-10-CM

## 2025-04-28 SDOH — ECONOMIC STABILITY: INCOME INSECURITY: IN THE LAST 12 MONTHS, WAS THERE A TIME WHEN YOU WERE NOT ABLE TO PAY THE MORTGAGE OR RENT ON TIME?: NO

## 2025-04-28 SDOH — ECONOMIC STABILITY: FOOD INSECURITY: WITHIN THE PAST 12 MONTHS, THE FOOD YOU BOUGHT JUST DIDN'T LAST AND YOU DIDN'T HAVE MONEY TO GET MORE.: NEVER TRUE

## 2025-04-28 SDOH — ECONOMIC STABILITY: FOOD INSECURITY: WITHIN THE PAST 12 MONTHS, YOU WORRIED THAT YOUR FOOD WOULD RUN OUT BEFORE YOU GOT MONEY TO BUY MORE.: NEVER TRUE

## 2025-04-28 SDOH — ECONOMIC STABILITY: TRANSPORTATION INSECURITY
IN THE PAST 12 MONTHS, HAS THE LACK OF TRANSPORTATION KEPT YOU FROM MEDICAL APPOINTMENTS OR FROM GETTING MEDICATIONS?: NO

## 2025-04-28 ASSESSMENT — PATIENT HEALTH QUESTIONNAIRE - PHQ9
SUM OF ALL RESPONSES TO PHQ QUESTIONS 1-9: 2
SUM OF ALL RESPONSES TO PHQ9 QUESTIONS 1 & 2: 2
1. LITTLE INTEREST OR PLEASURE IN DOING THINGS: SEVERAL DAYS
SUM OF ALL RESPONSES TO PHQ QUESTIONS 1-9: 2
1. LITTLE INTEREST OR PLEASURE IN DOING THINGS: SEVERAL DAYS
2. FEELING DOWN, DEPRESSED OR HOPELESS: SEVERAL DAYS
SUM OF ALL RESPONSES TO PHQ QUESTIONS 1-9: 2
2. FEELING DOWN, DEPRESSED OR HOPELESS: SEVERAL DAYS
SUM OF ALL RESPONSES TO PHQ QUESTIONS 1-9: 2

## 2025-04-30 ENCOUNTER — HOSPITAL ENCOUNTER (OUTPATIENT)
Age: 35
Discharge: HOME | End: 2025-04-30
Payer: COMMERCIAL

## 2025-04-30 DIAGNOSIS — E87.5 HYPERKALEMIA: ICD-10-CM

## 2025-04-30 DIAGNOSIS — E83.52 HYPERCALCEMIA: ICD-10-CM

## 2025-04-30 LAB
ANION GAP SERPL CALCULATED.3IONS-SCNC: 13 MMOL/L (ref 9–16)
BUN SERPL-MCNC: 20 MG/DL (ref 6–20)
BUN/CREAT SERPL: 18 (ref 9–20)
CALCIUM SERPL-MCNC: 10.3 MG/DL (ref 8.6–10.4)
CHLORIDE SERPL-SCNC: 98 MMOL/L (ref 98–107)
CO2 SERPL-SCNC: 26 MMOL/L (ref 20–31)
CREAT SERPL-MCNC: 1.1 MG/DL (ref 0.7–1.2)
GFR, ESTIMATED: 89 ML/MIN/1.73M2
GLUCOSE SERPL-MCNC: 87 MG/DL (ref 74–99)
POTASSIUM SERPL-SCNC: 4.9 MMOL/L (ref 3.7–5.3)
SODIUM SERPL-SCNC: 137 MMOL/L (ref 136–145)

## 2025-04-30 PROCEDURE — 36415 COLL VENOUS BLD VENIPUNCTURE: CPT

## 2025-04-30 PROCEDURE — 80048 BASIC METABOLIC PNL TOTAL CA: CPT

## 2025-05-01 ENCOUNTER — OFFICE VISIT (OUTPATIENT)
Dept: FAMILY MEDICINE CLINIC | Age: 35
End: 2025-05-01
Payer: COMMERCIAL

## 2025-05-01 VITALS
OXYGEN SATURATION: 97 % | TEMPERATURE: 98 F | HEART RATE: 100 BPM | BODY MASS INDEX: 24.59 KG/M2 | WEIGHT: 166 LBS | DIASTOLIC BLOOD PRESSURE: 60 MMHG | HEIGHT: 69 IN | RESPIRATION RATE: 16 BRPM | SYSTOLIC BLOOD PRESSURE: 110 MMHG

## 2025-05-01 DIAGNOSIS — E55.9 VITAMIN D DEFICIENCY: ICD-10-CM

## 2025-05-01 DIAGNOSIS — I10 ESSENTIAL HYPERTENSION: Primary | ICD-10-CM

## 2025-05-01 DIAGNOSIS — R73.01 ELEVATED FASTING GLUCOSE: ICD-10-CM

## 2025-05-01 DIAGNOSIS — E83.52 HYPERCALCEMIA: ICD-10-CM

## 2025-05-01 PROCEDURE — 3078F DIAST BP <80 MM HG: CPT | Performed by: FAMILY MEDICINE

## 2025-05-01 PROCEDURE — 99214 OFFICE O/P EST MOD 30 MIN: CPT | Performed by: FAMILY MEDICINE

## 2025-05-01 PROCEDURE — 3074F SYST BP LT 130 MM HG: CPT | Performed by: FAMILY MEDICINE

## 2025-05-01 RX ORDER — AMLODIPINE AND BENAZEPRIL HYDROCHLORIDE 10; 40 MG/1; MG/1
CAPSULE ORAL
Qty: 90 CAPSULE | Refills: 0 | Status: SHIPPED | OUTPATIENT
Start: 2025-05-01

## 2025-05-02 ENCOUNTER — RESULTS FOLLOW-UP (OUTPATIENT)
Dept: FAMILY MEDICINE CLINIC | Age: 35
End: 2025-05-02

## 2025-08-10 DIAGNOSIS — I10 ESSENTIAL HYPERTENSION: ICD-10-CM

## 2025-08-11 RX ORDER — AMLODIPINE AND BENAZEPRIL HYDROCHLORIDE 10; 40 MG/1; MG/1
1 CAPSULE ORAL DAILY
Qty: 90 CAPSULE | Refills: 0 | Status: SHIPPED | OUTPATIENT
Start: 2025-08-11